# Patient Record
Sex: FEMALE | Race: WHITE | Employment: PART TIME
[De-identification: names, ages, dates, MRNs, and addresses within clinical notes are randomized per-mention and may not be internally consistent; named-entity substitution may affect disease eponyms.]

---

## 2017-02-10 ENCOUNTER — NURSE ONLY (OUTPATIENT)
Dept: FAMILY MEDICINE CLINIC | Facility: CLINIC | Age: 32
End: 2017-02-10

## 2017-02-10 ENCOUNTER — TELEPHONE (OUTPATIENT)
Dept: FAMILY MEDICINE CLINIC | Facility: CLINIC | Age: 32
End: 2017-02-10

## 2017-02-10 DIAGNOSIS — G43.919 INTRACTABLE MIGRAINE WITHOUT STATUS MIGRAINOSUS, UNSPECIFIED MIGRAINE TYPE: Primary | ICD-10-CM

## 2017-02-10 PROCEDURE — 96372 THER/PROPH/DIAG INJ SC/IM: CPT | Performed by: FAMILY MEDICINE

## 2017-02-10 RX ORDER — KETOROLAC TROMETHAMINE 30 MG/ML
30 INJECTION, SOLUTION INTRAMUSCULAR; INTRAVENOUS ONCE
Status: COMPLETED | OUTPATIENT
Start: 2017-02-10 | End: 2017-02-10

## 2017-02-10 RX ORDER — KETOROLAC TROMETHAMINE 30 MG/ML
30 INJECTION, SOLUTION INTRAMUSCULAR; INTRAVENOUS ONCE
Qty: 1 ML | Refills: 0
Start: 2017-02-10 | End: 2017-02-10 | Stop reason: CLARIF

## 2017-02-10 RX ORDER — PROMETHAZINE HYDROCHLORIDE 25 MG/ML
6.25 INJECTION, SOLUTION INTRAMUSCULAR; INTRAVENOUS EVERY 6 HOURS PRN
Status: DISCONTINUED | OUTPATIENT
Start: 2017-02-10 | End: 2021-03-03

## 2017-02-10 RX ORDER — PROMETHAZINE HYDROCHLORIDE 25 MG/ML
25 INJECTION, SOLUTION INTRAMUSCULAR; INTRAVENOUS ONCE
Qty: 1 ML | Refills: 0
Start: 2017-02-10 | End: 2017-02-10 | Stop reason: CLARIF

## 2017-02-10 RX ADMIN — PROMETHAZINE HYDROCHLORIDE 6.25 MG: 25 INJECTION, SOLUTION INTRAMUSCULAR; INTRAVENOUS at 11:26

## 2017-02-10 RX ADMIN — KETOROLAC TROMETHAMINE 30 MG: 30 INJECTION, SOLUTION INTRAMUSCULAR; INTRAVENOUS at 11:23

## 2017-02-17 ENCOUNTER — TELEPHONE (OUTPATIENT)
Dept: FAMILY MEDICINE CLINIC | Facility: CLINIC | Age: 32
End: 2017-02-17

## 2017-02-17 RX ORDER — SUMATRIPTAN 25 MG/1
25 TABLET, FILM COATED ORAL
Qty: 10 TABLET | Refills: 3 | Status: SHIPPED | OUTPATIENT
Start: 2017-02-17 | End: 2018-06-05

## 2017-02-24 ENCOUNTER — TELEPHONE (OUTPATIENT)
Dept: FAMILY MEDICINE CLINIC | Facility: CLINIC | Age: 32
End: 2017-02-24

## 2017-04-21 RX ORDER — PROMETHAZINE HYDROCHLORIDE 25 MG/1
25 TABLET ORAL EVERY 6 HOURS PRN
Qty: 30 TABLET | Refills: 0 | Status: SHIPPED | OUTPATIENT
Start: 2017-04-21 | End: 2017-04-28

## 2017-04-28 ENCOUNTER — OFFICE VISIT (OUTPATIENT)
Dept: FAMILY MEDICINE CLINIC | Age: 32
End: 2017-04-28
Payer: COMMERCIAL

## 2017-04-28 VITALS
HEIGHT: 64 IN | HEART RATE: 94 BPM | BODY MASS INDEX: 23.56 KG/M2 | OXYGEN SATURATION: 98 % | DIASTOLIC BLOOD PRESSURE: 62 MMHG | TEMPERATURE: 98.5 F | SYSTOLIC BLOOD PRESSURE: 102 MMHG | WEIGHT: 138 LBS

## 2017-04-28 DIAGNOSIS — J02.0 PHARYNGITIS DUE TO STREPTOCOCCUS SPECIES: Primary | ICD-10-CM

## 2017-04-28 LAB — S PYO AG THROAT QL: POSITIVE

## 2017-04-28 PROCEDURE — 87880 STREP A ASSAY W/OPTIC: CPT | Performed by: NURSE PRACTITIONER

## 2017-04-28 PROCEDURE — 99213 OFFICE O/P EST LOW 20 MIN: CPT | Performed by: NURSE PRACTITIONER

## 2017-04-28 RX ORDER — AMOXICILLIN 500 MG/1
500 CAPSULE ORAL 2 TIMES DAILY
Qty: 20 CAPSULE | Refills: 0 | Status: SHIPPED | OUTPATIENT
Start: 2017-04-28 | End: 2017-05-08

## 2017-04-28 ASSESSMENT — ENCOUNTER SYMPTOMS
SORE THROAT: 1
SWOLLEN GLANDS: 1
COUGH: 0
NAUSEA: 1
VOMITING: 1

## 2017-06-20 ENCOUNTER — TELEPHONE (OUTPATIENT)
Dept: FAMILY MEDICINE CLINIC | Age: 32
End: 2017-06-20

## 2017-06-20 RX ORDER — CEPHALEXIN 500 MG/1
500 CAPSULE ORAL 4 TIMES DAILY
Qty: 40 CAPSULE | Refills: 0 | Status: SHIPPED | OUTPATIENT
Start: 2017-06-20 | End: 2017-06-30

## 2017-09-02 ENCOUNTER — OFFICE VISIT (OUTPATIENT)
Dept: PRIMARY CARE CLINIC | Age: 32
End: 2017-09-02
Payer: COMMERCIAL

## 2017-09-02 VITALS
BODY MASS INDEX: 21.06 KG/M2 | HEART RATE: 56 BPM | WEIGHT: 122.7 LBS | DIASTOLIC BLOOD PRESSURE: 66 MMHG | TEMPERATURE: 97.9 F | SYSTOLIC BLOOD PRESSURE: 101 MMHG

## 2017-09-02 DIAGNOSIS — R30.0 DYSURIA: ICD-10-CM

## 2017-09-02 DIAGNOSIS — N30.00 ACUTE CYSTITIS WITHOUT HEMATURIA: Primary | ICD-10-CM

## 2017-09-02 PROCEDURE — 99213 OFFICE O/P EST LOW 20 MIN: CPT | Performed by: NURSE PRACTITIONER

## 2017-09-02 PROCEDURE — 81003 URINALYSIS AUTO W/O SCOPE: CPT | Performed by: NURSE PRACTITIONER

## 2017-09-02 RX ORDER — SUMATRIPTAN 25 MG/1
TABLET, FILM COATED ORAL
Refills: 3 | COMMUNITY
Start: 2017-07-24 | End: 2018-06-05

## 2017-09-02 RX ORDER — CEPHALEXIN 500 MG/1
500 CAPSULE ORAL 2 TIMES DAILY
Qty: 14 CAPSULE | Refills: 0 | Status: SHIPPED | OUTPATIENT
Start: 2017-09-02 | End: 2017-09-09

## 2017-09-02 RX ORDER — CEPHALEXIN 500 MG/1
CAPSULE ORAL
COMMUNITY
Start: 2017-06-20 | End: 2017-09-02 | Stop reason: ALTCHOICE

## 2017-09-02 ASSESSMENT — ENCOUNTER SYMPTOMS: ABDOMINAL PAIN: 1

## 2017-09-12 LAB
BILIRUBIN, POC: NEGATIVE
BLOOD URINE, POC: ABNORMAL
CLARITY, POC: ABNORMAL
COLOR, POC: ABNORMAL
GLUCOSE URINE, POC: NEGATIVE
KETONES, POC: NEGATIVE
LEUKOCYTE EST, POC: ABNORMAL
NITRITE, POC: NEGATIVE
PH, POC: 5.5
PROTEIN, POC: ABNORMAL
SPECIFIC GRAVITY, POC: 1.02
UROBILINOGEN, POC: 0.2

## 2017-09-20 ENCOUNTER — OFFICE VISIT (OUTPATIENT)
Dept: FAMILY MEDICINE CLINIC | Age: 32
End: 2017-09-20
Payer: COMMERCIAL

## 2017-09-20 VITALS
OXYGEN SATURATION: 98 % | BODY MASS INDEX: 22.83 KG/M2 | DIASTOLIC BLOOD PRESSURE: 62 MMHG | HEART RATE: 60 BPM | SYSTOLIC BLOOD PRESSURE: 102 MMHG | WEIGHT: 133 LBS

## 2017-09-20 DIAGNOSIS — N61.0 MASTITIS: ICD-10-CM

## 2017-09-20 DIAGNOSIS — H72.92 TYMPANIC MEMBRANE PERFORATION, LEFT: Primary | ICD-10-CM

## 2017-09-20 PROCEDURE — 99213 OFFICE O/P EST LOW 20 MIN: CPT | Performed by: FAMILY MEDICINE

## 2017-09-20 RX ORDER — SULFAMETHOXAZOLE AND TRIMETHOPRIM 800; 160 MG/1; MG/1
1 TABLET ORAL 2 TIMES DAILY
Qty: 14 TABLET | Refills: 0 | Status: SHIPPED | OUTPATIENT
Start: 2017-09-20 | End: 2017-09-27

## 2017-09-20 RX ORDER — OFLOXACIN 3 MG/ML
10 SOLUTION AURICULAR (OTIC) 2 TIMES DAILY
Qty: 10 ML | Refills: 0 | Status: SHIPPED | OUTPATIENT
Start: 2017-09-20 | End: 2017-10-04

## 2017-09-20 NOTE — PROGRESS NOTES
unexpected weight change. HENT: Positive for ear pain. Negative for ear discharge and trouble swallowing. Eyes: Negative for photophobia and visual disturbance. Respiratory: Negative for cough, shortness of breath and wheezing. Cardiovascular: Negative for chest pain and palpitations. Gastrointestinal: Negative for abdominal distention, abdominal pain, constipation, diarrhea, nausea and vomiting. Endocrine: Negative for polydipsia, polyphagia and polyuria. Musculoskeletal: Negative for arthralgias, back pain, gait problem, joint swelling, myalgias, neck pain and neck stiffness. Skin: Positive for rash. Negative for color change. Neurological: Negative for dizziness, syncope, weakness, light-headedness and headaches. Psychiatric/Behavioral: Negative for dysphoric mood. The patient is not nervous/anxious. Physical Exam:     Physical Exam   Constitutional: She is oriented to person, place, and time. She appears well-developed and well-nourished. HENT:   Head: Normocephalic and atraumatic. Right Ear: External ear normal.   Left Ear: External ear normal. Tympanic membrane is perforated. Eyes: Conjunctivae and EOM are normal.   Neck: Normal range of motion. Neck supple. No thyromegaly present. Cardiovascular: Normal rate, regular rhythm and normal heart sounds. Exam reveals no gallop and no friction rub. No murmur heard. Pulmonary/Chest: Effort normal and breath sounds normal. No respiratory distress. She has no wheezes. She has no rales. She exhibits no tenderness. Abdominal: Soft. Bowel sounds are normal. She exhibits no distension. There is no tenderness. There is no rebound and no guarding. Musculoskeletal: Normal range of motion. She exhibits no edema. Lymphadenopathy:     She has no cervical adenopathy. Neurological: She is alert and oriented to person, place, and time. She exhibits normal muscle tone. Coordination normal.   Skin: Skin is warm and dry.  No rash noted. No erythema. Psychiatric: She has a normal mood and affect. Her behavior is normal. Judgment and thought content normal.   Nursing note and vitals reviewed. Vitals:  /62  Pulse 60  Wt 133 lb (60.3 kg)  LMP 08/26/2017  SpO2 98%  BMI 22.83 kg/m2      Data:     Lab Results   Component Value Date     12/31/2011    K 4.0 12/31/2011     12/31/2011    CO2 27 12/31/2011    BUN 14 12/31/2011    CREATININE 0.65 12/31/2011    GLUCOSE 74 12/31/2011    PROT 7.7 04/10/2015    LABALBU 4.9 04/10/2015    BILITOT 0.53 04/10/2015    ALKPHOS 45 04/10/2015    AST 22 04/10/2015    ALT 22 04/10/2015     Lab Results   Component Value Date    WBC 6.9 04/10/2015    RBC 4.28 04/10/2015    RBC 4.26 12/31/2011    HGB 13.3 04/10/2015    HCT 39.1 04/10/2015    MCV 91.5 04/10/2015    MCH 31.0 04/10/2015    MCHC 33.9 04/10/2015    RDW 13.2 04/10/2015     04/10/2015     12/31/2011    MPV NOT REPORTED 04/10/2015     No results found for: TSH  No results found for: CHOL, HDL, PSA, LABA1C       Assessment:       1. Tympanic membrane perforation, left     2. Mastitis         Plan:   1. Tympanic membrane perforation on the left-likely secondary to the pressure changes from when patient was scattered. Will Rx ofloxacin drops. 2.  Mastitis-patient symptoms consistent with mastitis, will Rx Bactrim. Call if not improving. Completed Refills   Requested Prescriptions     Signed Prescriptions Disp Refills    ofloxacin (FLOXIN) 0.3 % otic solution 10 mL 0     Sig: Place 10 drops into both ears 2 times daily for 14 days    sulfamethoxazole-trimethoprim (BACTRIM DS) 800-160 MG per tablet 14 tablet 0     Sig: Take 1 tablet by mouth 2 times daily for 7 days     Return if symptoms worsen or fail to improve.   Orders Placed This Encounter   Medications    ofloxacin (FLOXIN) 0.3 % otic solution     Sig: Place 10 drops into both ears 2 times daily for 14 days     Dispense:  10 mL     Refill:  0

## 2017-10-03 ASSESSMENT — ENCOUNTER SYMPTOMS
BACK PAIN: 0
VOMITING: 0
SHORTNESS OF BREATH: 0
TROUBLE SWALLOWING: 0
ABDOMINAL DISTENTION: 0
WHEEZING: 0
DIARRHEA: 0
CONSTIPATION: 0
PHOTOPHOBIA: 0
COLOR CHANGE: 0
NAUSEA: 0
COUGH: 0
ABDOMINAL PAIN: 0

## 2017-10-13 ENCOUNTER — OFFICE VISIT (OUTPATIENT)
Dept: FAMILY MEDICINE CLINIC | Age: 32
End: 2017-10-13
Payer: COMMERCIAL

## 2017-10-13 VITALS
HEART RATE: 68 BPM | WEIGHT: 126 LBS | SYSTOLIC BLOOD PRESSURE: 102 MMHG | OXYGEN SATURATION: 98 % | DIASTOLIC BLOOD PRESSURE: 74 MMHG | BODY MASS INDEX: 21.63 KG/M2

## 2017-10-13 DIAGNOSIS — Z13.220 SCREENING FOR LIPOID DISORDERS: ICD-10-CM

## 2017-10-13 DIAGNOSIS — Z13.1 ENCOUNTER FOR SCREENING EXAMINATION FOR IMPAIRED GLUCOSE REGULATION AND DIABETES MELLITUS: ICD-10-CM

## 2017-10-13 DIAGNOSIS — Z00.00 WELL ADULT EXAM: Primary | ICD-10-CM

## 2017-10-13 PROCEDURE — 99395 PREV VISIT EST AGE 18-39: CPT | Performed by: FAMILY MEDICINE

## 2017-10-19 ENCOUNTER — HOSPITAL ENCOUNTER (OUTPATIENT)
Age: 32
Discharge: HOME | End: 2017-10-19
Payer: COMMERCIAL

## 2017-10-19 DIAGNOSIS — Z13.220 SCREENING FOR LIPOID DISORDERS: ICD-10-CM

## 2017-10-19 DIAGNOSIS — Z13.1 ENCOUNTER FOR SCREENING EXAMINATION FOR IMPAIRED GLUCOSE REGULATION AND DIABETES MELLITUS: ICD-10-CM

## 2017-10-19 DIAGNOSIS — Z00.00 WELL ADULT EXAM: ICD-10-CM

## 2017-10-19 LAB
CHOLESTEROL/HDL RATIO: 2.2
CHOLESTEROL: 136 MG/DL
GLUCOSE FASTING: 83 MG/DL (ref 70–99)
HDLC SERPL-MCNC: 62 MG/DL
LDL CHOLESTEROL: 66 MG/DL (ref 0–130)
PATIENT FASTING?: YES
TRIGL SERPL-MCNC: 40 MG/DL
VLDLC SERPL CALC-MCNC: NORMAL MG/DL (ref 1–30)

## 2017-10-19 PROCEDURE — 36415 COLL VENOUS BLD VENIPUNCTURE: CPT

## 2017-10-19 PROCEDURE — 80061 LIPID PANEL: CPT

## 2017-10-19 PROCEDURE — 82947 ASSAY GLUCOSE BLOOD QUANT: CPT

## 2017-10-29 ASSESSMENT — ENCOUNTER SYMPTOMS
ABDOMINAL PAIN: 0
SHORTNESS OF BREATH: 0
VOMITING: 0
WHEEZING: 0
PHOTOPHOBIA: 0
BACK PAIN: 0
COUGH: 0
TROUBLE SWALLOWING: 0
DIARRHEA: 0
ABDOMINAL DISTENTION: 0
CONSTIPATION: 0
COLOR CHANGE: 0

## 2017-10-29 NOTE — PROGRESS NOTES
HPI Notes    Name: Sahara Montgomery  : 1985         Chief Complaint:     Chief Complaint   Patient presents with    Annual Exam       History of Present Illness:      Sahara Montgomery is a 28 y.o.  female who presents with Annual Exam      HPI  Patient here for wellness visit for work. Patient states that she is doing well except she has had a recent viral illness which has been bothering her. Patient states that she is resting and taking Tylenol and staying hydrated. Patient denies any chest pain, shortness of breath, blurred vision. Patient states that she tries to shows regularly and eat healthy. Patient denies any other acute problems or complaints at this time. Past Medical History:     No past medical history on file. Reviewed all health maintenance requirements and ordered appropriate tests  Health Maintenance Due   Topic Date Due    HIV screen  2000    DTaP/Tdap/Td vaccine (1 - Tdap) 2004    Cervical cancer screen  2006    Flu vaccine (1) 2017       Past Surgical History:     Past Surgical History:   Procedure Laterality Date    APPENDECTOMY      OVARY REMOVAL Left     TYMPANOSTOMY TUBE PLACEMENT          Medications:       Prior to Admission medications    Medication Sig Start Date End Date Taking? Authorizing Provider   SUMAtriptan (IMITREX) 25 MG tablet TAKE 1 TABLET BY MOUTH once AS NEEDED FOR MIGRAINE - - may repeat once in 2 (TWO) hours, not more than 2 (TWO) pills in 24 hours 17  Yes Historical Provider, MD   ACZONE 5 % GEL Apply daily 11/13/15  Yes Historical Provider, MD   SUMAtriptan (IMITREX) 25 MG tablet Take 1 tablet by mouth once as needed for Migraine (may repeat once in 2 hours, not to take more than 2 pills in 24 hours) 17  Mira Speak, DO        Allergies:       Review of patient's allergies indicates no known allergies. Social History:     Tobacco:    reports that she has never smoked.  She has never used smokeless tobacco.  Alcohol:      reports that she does not drink alcohol. Drug Use:  reports that she does not use drugs. Family History:     Family History   Problem Relation Age of Onset    Hypertension Mother        Review of Systems:     Positive and Negative as described in HPI    Review of Systems   Constitutional: Negative for activity change, appetite change, fever and unexpected weight change. HENT: Positive for congestion. Negative for ear discharge, ear pain and trouble swallowing. Eyes: Negative for photophobia and visual disturbance. Respiratory: Negative for cough, shortness of breath and wheezing. Cardiovascular: Negative for chest pain and palpitations. Gastrointestinal: Negative for abdominal distention, abdominal pain, constipation, diarrhea and vomiting. Endocrine: Negative for polydipsia, polyphagia and polyuria. Genitourinary: Negative for frequency and urgency. Musculoskeletal: Negative for arthralgias, back pain, gait problem, joint swelling, myalgias, neck pain and neck stiffness. Skin: Negative for color change and rash. Allergic/Immunologic: Negative for environmental allergies and food allergies. Neurological: Negative for dizziness, syncope, weakness, light-headedness and headaches. Hematological: Negative for adenopathy. Does not bruise/bleed easily. Psychiatric/Behavioral: Negative for dysphoric mood. The patient is not nervous/anxious. Physical Exam:     Physical Exam   Constitutional: She is oriented to person, place, and time. She appears well-developed and well-nourished. HENT:   Head: Normocephalic and atraumatic. Right Ear: External ear normal.   Left Ear: External ear normal.   Eyes: Conjunctivae and EOM are normal.   Neck: Normal range of motion. Neck supple. No thyromegaly present. Cardiovascular: Normal rate, regular rhythm and normal heart sounds. Exam reveals no gallop and no friction rub.     No murmur heard.  Pulmonary/Chest: Effort normal and breath sounds normal. No respiratory distress. She has no wheezes. She has no rales. She exhibits no tenderness. Abdominal: Soft. Bowel sounds are normal. She exhibits no distension. There is no tenderness. There is no rebound and no guarding. Musculoskeletal: Normal range of motion. She exhibits no edema. Lymphadenopathy:     She has no cervical adenopathy. Neurological: She is alert and oriented to person, place, and time. She exhibits normal muscle tone. Coordination normal.   Skin: Skin is warm and dry. No rash noted. No erythema. Psychiatric: She has a normal mood and affect. Her behavior is normal. Judgment and thought content normal.   Nursing note and vitals reviewed. Vitals:  /74   Pulse 68   Wt 126 lb (57.2 kg)   SpO2 98%   BMI 21.63 kg/m²       Data:     Lab Results   Component Value Date     12/31/2011    K 4.0 12/31/2011     12/31/2011    CO2 27 12/31/2011    BUN 14 12/31/2011    CREATININE 0.65 12/31/2011    GLUCOSE 74 12/31/2011    PROT 7.7 04/10/2015    LABALBU 4.9 04/10/2015    BILITOT 0.53 04/10/2015    ALKPHOS 45 04/10/2015    AST 22 04/10/2015    ALT 22 04/10/2015     Lab Results   Component Value Date    WBC 6.9 04/10/2015    RBC 4.28 04/10/2015    RBC 4.26 12/31/2011    HGB 13.3 04/10/2015    HCT 39.1 04/10/2015    MCV 91.5 04/10/2015    MCH 31.0 04/10/2015    MCHC 33.9 04/10/2015    RDW 13.2 04/10/2015     04/10/2015     12/31/2011    MPV NOT REPORTED 04/10/2015     No results found for: TSH  Lab Results   Component Value Date    CHOL 136 10/19/2017    HDL 62 10/19/2017          Assessment:       1. Well adult exam  Lipid Panel    Glucose, Fasting   2. Screening for lipoid disorders  Lipid Panel   3. Encounter for screening examination for impaired glucose regulation and diabetes mellitus  Glucose, Fasting       Plan:   1.   Will.exam-continue current care, discussed nutrition and exercise with plan consists of No treatment change needed. Lab Results   Component Value Date    LDLCHOLESTEROL 66 10/19/2017    (goal LDL reduction with dx if diabetes is 50% LDL reduction)      No flowsheet data found.   Interpretation of Total Score Depression Severity: 1-4 = Minimal depression, 5-9 = Mild depression, 10-14 = Moderate depression, 15-19 = Moderately severe depression, 20-27 = Severe depression

## 2017-12-28 ENCOUNTER — OFFICE VISIT (OUTPATIENT)
Dept: FAMILY MEDICINE CLINIC | Age: 32
End: 2017-12-28

## 2017-12-28 VITALS
DIASTOLIC BLOOD PRESSURE: 60 MMHG | WEIGHT: 136 LBS | SYSTOLIC BLOOD PRESSURE: 102 MMHG | HEART RATE: 80 BPM | BODY MASS INDEX: 23.34 KG/M2 | OXYGEN SATURATION: 99 %

## 2017-12-28 DIAGNOSIS — M99.01 CERVICAL SOMATIC DYSFUNCTION: Primary | ICD-10-CM

## 2017-12-28 PROCEDURE — 99212 OFFICE O/P EST SF 10 MIN: CPT | Performed by: FAMILY MEDICINE

## 2017-12-28 RX ORDER — CYCLOBENZAPRINE HCL 5 MG
5 TABLET ORAL 3 TIMES DAILY PRN
Qty: 30 TABLET | Refills: 0 | Status: SHIPPED | OUTPATIENT
Start: 2017-12-28 | End: 2018-01-07

## 2017-12-28 NOTE — PATIENT INSTRUCTIONS
SURVEY:    You may be receiving a survey from [employer] regarding your visit today. Please complete the survey to enable us to provide the highest quality of care to you and your family. If you cannot score us a very good on any question, please call the office to discuss how we could of made your experience a very good one. Thank you.

## 2018-01-01 ENCOUNTER — HOSPITAL ENCOUNTER (OUTPATIENT)
Dept: GENERAL RADIOLOGY | Age: 33
Discharge: HOME | End: 2018-01-01
Payer: COMMERCIAL

## 2018-01-01 ENCOUNTER — HOSPITAL ENCOUNTER (OUTPATIENT)
Age: 33
Discharge: HOME | End: 2018-01-01
Payer: COMMERCIAL

## 2018-01-01 DIAGNOSIS — M99.01 CERVICAL SOMATIC DYSFUNCTION: ICD-10-CM

## 2018-01-01 PROCEDURE — 72050 X-RAY EXAM NECK SPINE 4/5VWS: CPT

## 2018-01-02 ENCOUNTER — TELEPHONE (OUTPATIENT)
Dept: FAMILY MEDICINE CLINIC | Age: 33
End: 2018-01-02

## 2018-01-02 DIAGNOSIS — M54.2 NECK PAIN: Primary | ICD-10-CM

## 2018-01-29 ENCOUNTER — OFFICE VISIT (OUTPATIENT)
Dept: PRIMARY CARE CLINIC | Age: 33
End: 2018-01-29
Payer: COMMERCIAL

## 2018-01-29 VITALS
RESPIRATION RATE: 20 BRPM | BODY MASS INDEX: 24.29 KG/M2 | WEIGHT: 132 LBS | OXYGEN SATURATION: 97 % | HEIGHT: 62 IN | SYSTOLIC BLOOD PRESSURE: 117 MMHG | DIASTOLIC BLOOD PRESSURE: 79 MMHG | TEMPERATURE: 98.2 F | HEART RATE: 74 BPM

## 2018-01-29 DIAGNOSIS — J06.9 UPPER RESPIRATORY TRACT INFECTION, UNSPECIFIED TYPE: ICD-10-CM

## 2018-01-29 DIAGNOSIS — R05.9 COUGH: ICD-10-CM

## 2018-01-29 DIAGNOSIS — J04.0 LARYNGITIS: Primary | ICD-10-CM

## 2018-01-29 DIAGNOSIS — J02.9 SORE THROAT: ICD-10-CM

## 2018-01-29 LAB
INFLUENZA A ANTIBODY: NEGATIVE
INFLUENZA B ANTIBODY: NEGATIVE
S PYO AG THROAT QL: NORMAL

## 2018-01-29 PROCEDURE — 87804 INFLUENZA ASSAY W/OPTIC: CPT | Performed by: NURSE PRACTITIONER

## 2018-01-29 PROCEDURE — 87880 STREP A ASSAY W/OPTIC: CPT | Performed by: NURSE PRACTITIONER

## 2018-01-29 PROCEDURE — 99213 OFFICE O/P EST LOW 20 MIN: CPT | Performed by: NURSE PRACTITIONER

## 2018-01-29 ASSESSMENT — ENCOUNTER SYMPTOMS
STRIDOR: 0
WHEEZING: 0
SORE THROAT: 1
SHORTNESS OF BREATH: 0
COUGH: 1

## 2018-01-30 NOTE — PATIENT INSTRUCTIONS
syndrome, a serious illness. · Drink plenty of fluids, enough so that your urine is light yellow or clear like water. Hot fluids, such as tea or soup, may help relieve congestion in your nose and throat. If you have kidney, heart, or liver disease and have to limit fluids, talk with your doctor before you increase the amount of fluids you drink. · Try to clear mucus from your lungs by breathing deeply and coughing. · Gargle with warm salt water once an hour. This can help reduce swelling and throat pain. Use 1 teaspoon of salt mixed in 1 cup of warm water. · Do not smoke or allow others to smoke around you. If you need help quitting, talk to your doctor about stop-smoking programs and medicines. These can increase your chances of quitting for good. To avoid spreading the virus  · Cough or sneeze into a tissue. Then throw the tissue away. · If you don't have a tissue, use your hand to cover your cough or sneeze. Then clean your hand. You can also cough into your sleeve. · Wash your hands often. Use soap and warm water. Wash for 15 to 20 seconds each time. · If you don't have soap and water near you, you can clean your hands with alcohol wipes or gel. When should you call for help? Call your doctor now or seek immediate medical care if:  ? · You have a new or higher fever. ? · Your fever lasts more than 48 hours. ? · You have trouble breathing. ? · You have a fever with a stiff neck or a severe headache. ? · You are sensitive to light. ? · You feel very sleepy or confused. ? Watch closely for changes in your health, and be sure to contact your doctor if:  ? · You do not get better as expected. Where can you learn more? Go to [email or web address]://[email or web address]. org and sign in to your [employer] account. Enter O754 in the [employer] box to learn more about \"Viral Respiratory Infection: Care Instructions. \"     If you do not have an account, please click on the \"Sign Up Now\"

## 2018-02-01 ENCOUNTER — TELEPHONE (OUTPATIENT)
Dept: PRIMARY CARE CLINIC | Age: 33
End: 2018-02-01

## 2018-02-01 DIAGNOSIS — J04.0 ACUTE LARYNGITIS: Primary | ICD-10-CM

## 2018-02-01 RX ORDER — PREDNISONE 20 MG/1
TABLET ORAL
Qty: 12 TABLET | Refills: 0 | Status: SHIPPED | OUTPATIENT
Start: 2018-02-01 | End: 2018-02-28 | Stop reason: ALTCHOICE

## 2018-02-23 ENCOUNTER — HOSPITAL ENCOUNTER (OUTPATIENT)
Dept: PHYSICAL THERAPY | Age: 33
Setting detail: THERAPIES SERIES
Discharge: HOME | End: 2018-02-23
Payer: COMMERCIAL

## 2018-02-23 PROCEDURE — 97110 THERAPEUTIC EXERCISES: CPT

## 2018-02-23 PROCEDURE — 97161 PT EVAL LOW COMPLEX 20 MIN: CPT

## 2018-02-23 ASSESSMENT — PAIN SCALES - GENERAL: PAINLEVEL_OUTOF10: 3

## 2018-02-23 ASSESSMENT — PAIN DESCRIPTION - LOCATION: LOCATION: NECK

## 2018-02-23 ASSESSMENT — PAIN DESCRIPTION - DIRECTION: RADIATING_TOWARDS: LUE

## 2018-02-23 NOTE — PROGRESS NOTES
Patient goals : To manage neck pain long term and function without pain.      Timed Code Treatment Minutes: 20 Minutes  Total Treatment Time: 60     Time In: 036  Time Out: Britt 996, [facility]/Directly Supervised By: Araceli Dobbins, PT  2/23/2018

## 2018-02-28 ENCOUNTER — HOSPITAL ENCOUNTER (OUTPATIENT)
Age: 33
Setting detail: SPECIMEN
Discharge: HOME | End: 2018-02-28
Payer: COMMERCIAL

## 2018-02-28 ENCOUNTER — OFFICE VISIT (OUTPATIENT)
Dept: FAMILY MEDICINE CLINIC | Age: 33
End: 2018-02-28
Payer: COMMERCIAL

## 2018-02-28 VITALS
HEIGHT: 64 IN | SYSTOLIC BLOOD PRESSURE: 108 MMHG | RESPIRATION RATE: 18 BRPM | HEART RATE: 66 BPM | BODY MASS INDEX: 23.22 KG/M2 | WEIGHT: 136 LBS | OXYGEN SATURATION: 99 % | TEMPERATURE: 98.6 F | DIASTOLIC BLOOD PRESSURE: 64 MMHG

## 2018-02-28 DIAGNOSIS — N89.8 VAGINAL ITCHING: Primary | ICD-10-CM

## 2018-02-28 LAB
DIRECT EXAM: ABNORMAL
Lab: ABNORMAL
SPECIMEN DESCRIPTION: ABNORMAL
STATUS: ABNORMAL

## 2018-02-28 PROCEDURE — 87210 SMEAR WET MOUNT SALINE/INK: CPT

## 2018-02-28 PROCEDURE — 99213 OFFICE O/P EST LOW 20 MIN: CPT | Performed by: NURSE PRACTITIONER

## 2018-02-28 RX ORDER — PROMETHAZINE HYDROCHLORIDE 12.5 MG/1
12.5 TABLET ORAL EVERY 6 HOURS PRN
COMMUNITY
End: 2018-06-05 | Stop reason: SDUPTHER

## 2018-02-28 ASSESSMENT — PATIENT HEALTH QUESTIONNAIRE - PHQ9
SUM OF ALL RESPONSES TO PHQ9 QUESTIONS 1 & 2: 0
SUM OF ALL RESPONSES TO PHQ QUESTIONS 1-9: 0
1. LITTLE INTEREST OR PLEASURE IN DOING THINGS: 0
2. FEELING DOWN, DEPRESSED OR HOPELESS: 0

## 2018-03-02 ENCOUNTER — HOSPITAL ENCOUNTER (OUTPATIENT)
Dept: PHYSICAL THERAPY | Age: 33
Setting detail: THERAPIES SERIES
Discharge: HOME | End: 2018-03-02
Payer: COMMERCIAL

## 2018-03-02 PROCEDURE — 97110 THERAPEUTIC EXERCISES: CPT

## 2018-03-02 PROCEDURE — 97140 MANUAL THERAPY 1/> REGIONS: CPT

## 2018-03-02 RX ORDER — INFLUENZA A VIRUS A/SINGAPORE/GP1908/2015 IVR-180A (H1N1) ANTIGEN (PROPIOLACTONE INACTIVATED), INFLUENZA A VIRUS A/SINGAPORE/INFIMH-16-0019/2016 IVR-186 (H3N2) ANTIGEN (PROPIOLACTONE INACTIVATED) AND INFLUENZA B VIRUS B/MARYLAND/15/2016 ANTIGEN (PROPIOLACTONE INACTIVATED) 15; 15; 15 UG/.5ML; UG/.5ML; UG/.5ML
INJECTION, SUSPENSION INTRAMUSCULAR
COMMUNITY
Start: 2018-01-07 | End: 2018-06-05

## 2018-03-02 ASSESSMENT — ENCOUNTER SYMPTOMS
ABDOMINAL DISTENTION: 0
CHEST TIGHTNESS: 0

## 2018-03-02 ASSESSMENT — PAIN SCALES - GENERAL: PAINLEVEL_OUTOF10: 0

## 2018-03-02 NOTE — PROGRESS NOTES
improved sleep quality.         Post Treatment Pain:  0/10    Time In: 815    Time Out : 900        Timed Code Treatment Minutes: 45 Minutes  Total Treatment Time: 39 Minutes    Chase Herrera, GENOVEVA /Directly Supervised by Amy Fernandez, PT     Date: 3/2/2018

## 2018-03-09 ENCOUNTER — HOSPITAL ENCOUNTER (OUTPATIENT)
Dept: PHYSICAL THERAPY | Age: 33
Setting detail: THERAPIES SERIES
End: 2018-03-09
Payer: COMMERCIAL

## 2018-03-13 ENCOUNTER — TELEPHONE (OUTPATIENT)
Dept: FAMILY MEDICINE CLINIC | Age: 33
End: 2018-03-13

## 2018-03-13 RX ORDER — OSELTAMIVIR PHOSPHATE 75 MG/1
75 CAPSULE ORAL 2 TIMES DAILY
Qty: 10 CAPSULE | Refills: 0 | Status: SHIPPED | OUTPATIENT
Start: 2018-03-13 | End: 2018-03-18

## 2018-03-16 ENCOUNTER — HOSPITAL ENCOUNTER (OUTPATIENT)
Dept: PHYSICAL THERAPY | Age: 33
Setting detail: THERAPIES SERIES
Discharge: HOME | End: 2018-03-16
Payer: COMMERCIAL

## 2018-03-16 PROCEDURE — 97140 MANUAL THERAPY 1/> REGIONS: CPT

## 2018-03-16 PROCEDURE — 97110 THERAPEUTIC EXERCISES: CPT

## 2018-03-16 ASSESSMENT — PAIN SCALES - GENERAL: PAINLEVEL_OUTOF10: 2

## 2018-03-16 ASSESSMENT — PAIN DESCRIPTION - LOCATION: LOCATION: NECK

## 2018-04-16 ENCOUNTER — TELEPHONE (OUTPATIENT)
Dept: FAMILY MEDICINE CLINIC | Age: 33
End: 2018-04-16

## 2018-04-18 RX ORDER — TOPIRAMATE 25 MG/1
25 TABLET ORAL NIGHTLY
Qty: 30 TABLET | Refills: 3 | Status: SHIPPED | OUTPATIENT
Start: 2018-04-18 | End: 2018-06-05 | Stop reason: SDUPTHER

## 2018-06-05 ENCOUNTER — TELEPHONE (OUTPATIENT)
Dept: FAMILY MEDICINE CLINIC | Age: 33
End: 2018-06-05

## 2018-06-05 RX ORDER — MULTIVITAMIN,THERAPEUTIC
TABLET ORAL
COMMUNITY
End: 2019-06-25 | Stop reason: ALTCHOICE

## 2018-06-05 RX ORDER — CYCLOBENZAPRINE HCL 5 MG
TABLET ORAL
COMMUNITY
Start: 2017-12-28 | End: 2019-05-22 | Stop reason: ALTCHOICE

## 2018-06-05 RX ORDER — SUMATRIPTAN 50 MG/1
50 TABLET, FILM COATED ORAL
Qty: 9 TABLET | Refills: 2 | Status: SHIPPED | OUTPATIENT
Start: 2018-06-05 | End: 2019-05-22 | Stop reason: ALTCHOICE

## 2018-06-05 RX ORDER — PROMETHAZINE HYDROCHLORIDE 12.5 MG/1
12.5 TABLET ORAL EVERY 6 HOURS PRN
Qty: 30 TABLET | Refills: 2 | Status: SHIPPED | OUTPATIENT
Start: 2018-06-05

## 2018-06-05 RX ORDER — SUMATRIPTAN 25 MG/1
TABLET, FILM COATED ORAL
COMMUNITY
Start: 2018-02-15 | End: 2018-06-05 | Stop reason: SDUPTHER

## 2018-06-05 RX ORDER — TOPIRAMATE 50 MG/1
50 TABLET, FILM COATED ORAL NIGHTLY
Qty: 90 TABLET | Refills: 1 | Status: SHIPPED | OUTPATIENT
Start: 2018-06-05 | End: 2018-08-16 | Stop reason: SDUPTHER

## 2018-07-26 ENCOUNTER — OFFICE VISIT (OUTPATIENT)
Dept: FAMILY MEDICINE CLINIC | Age: 33
End: 2018-07-26
Payer: COMMERCIAL

## 2018-07-26 VITALS — TEMPERATURE: 98.8 F | WEIGHT: 124 LBS | BODY MASS INDEX: 21.17 KG/M2 | HEIGHT: 64 IN

## 2018-07-26 DIAGNOSIS — J02.9 ACUTE PHARYNGITIS, UNSPECIFIED ETIOLOGY: Primary | ICD-10-CM

## 2018-07-26 LAB — S PYO AG THROAT QL: NORMAL

## 2018-07-26 PROCEDURE — 99213 OFFICE O/P EST LOW 20 MIN: CPT | Performed by: FAMILY MEDICINE

## 2018-07-26 PROCEDURE — 87880 STREP A ASSAY W/OPTIC: CPT | Performed by: FAMILY MEDICINE

## 2018-07-26 RX ORDER — AZITHROMYCIN 250 MG/1
TABLET, FILM COATED ORAL
Qty: 1 PACKET | Refills: 0 | Status: SHIPPED | OUTPATIENT
Start: 2018-07-26 | End: 2018-07-30

## 2018-07-26 NOTE — PATIENT INSTRUCTIONS
SURVEY:    You may be receiving a survey from [employer] regarding your visit today. Please complete the survey to enable us to provide the highest quality of care to you and your family. If you cannot score us as very good on any question, please call the office to discuss how we could have made your experience exceptional.     Thank you.

## 2018-07-27 ASSESSMENT — ENCOUNTER SYMPTOMS: GASTROINTESTINAL NEGATIVE: 1

## 2018-08-16 RX ORDER — TOPIRAMATE 50 MG/1
50 TABLET, FILM COATED ORAL NIGHTLY
Qty: 30 TABLET | Refills: 5 | Status: SHIPPED | OUTPATIENT
Start: 2018-08-16 | End: 2019-04-08 | Stop reason: SDUPTHER

## 2019-02-13 ENCOUNTER — OFFICE VISIT (OUTPATIENT)
Dept: PRIMARY CARE CLINIC | Age: 34
End: 2019-02-13
Payer: COMMERCIAL

## 2019-02-13 VITALS
HEART RATE: 64 BPM | OXYGEN SATURATION: 100 % | SYSTOLIC BLOOD PRESSURE: 112 MMHG | BODY MASS INDEX: 23.34 KG/M2 | DIASTOLIC BLOOD PRESSURE: 74 MMHG | WEIGHT: 136 LBS | TEMPERATURE: 98 F

## 2019-02-13 DIAGNOSIS — J02.9 PHARYNGITIS, UNSPECIFIED ETIOLOGY: Primary | ICD-10-CM

## 2019-02-13 DIAGNOSIS — J02.9 SORE THROAT: ICD-10-CM

## 2019-02-13 LAB — S PYO AG THROAT QL: NORMAL

## 2019-02-13 PROCEDURE — 87880 STREP A ASSAY W/OPTIC: CPT | Performed by: NURSE PRACTITIONER

## 2019-02-13 PROCEDURE — 99213 OFFICE O/P EST LOW 20 MIN: CPT | Performed by: NURSE PRACTITIONER

## 2019-02-13 RX ORDER — SUMATRIPTAN 50 MG/1
TABLET, FILM COATED ORAL
Refills: 2 | COMMUNITY
Start: 2019-01-03 | End: 2019-07-17 | Stop reason: SDUPTHER

## 2019-02-14 ASSESSMENT — ENCOUNTER SYMPTOMS
COUGH: 0
VOMITING: 0
NAUSEA: 0
SORE THROAT: 1

## 2019-04-08 RX ORDER — TOPIRAMATE 50 MG/1
50 TABLET, FILM COATED ORAL NIGHTLY
Qty: 30 TABLET | Refills: 5 | Status: SHIPPED
Start: 2019-04-08 | End: 2020-02-21 | Stop reason: SINTOL

## 2019-04-08 NOTE — TELEPHONE ENCOUNTER
Last visit:  7/26/2018  Next Visit Date:  No future appointments. Last Med refill:    Medication List:  Prior to Admission medications    Medication Sig Start Date End Date Taking? Authorizing Provider   SUMAtriptan (IMITREX) 50 MG tablet TAKE 1 TABLET BY MOUTH once AS NEEDED FOR MIGRAINE, may repeat in ONE HOUR if needed. Max dose 200 mg in 24 HOURS period 1/3/19   Historical Provider, MD   topiramate (TOPAMAX) 50 MG tablet TAKE 1 TABLET BY MOUTH nightly 8/16/18   Sheila Oats, DO   cyclobenzaprine (FLEXERIL) 5 MG tablet TAKE 1 TABLET BY MOUTH THREE TIMES DAILY AS NEEDED for muscle spasms 12/28/17   Historical Provider, MD   Multiple Vitamin (THERA/BETA-CAROTENE) TABS Take by mouth    Historical Provider, MD   promethazine (PHENERGAN) 12.5 MG tablet Take 1 tablet by mouth every 6 hours as needed for Nausea 6/5/18   Sheila Oats, DO   SUMAtriptan (IMITREX) 50 MG tablet Take 1 tablet by mouth once as needed for Migraine May repeat in 1h if needed. Max dose 200 mg in 24h period. 6/5/18 7/26/18  Sheila Oats, DO   ACZONE 5 % GEL Apply daily 11/13/15   Historical Provider, MD       Allergies:  Patient has no known allergies.     No results found for: LABA1C          ( goal A1C is < 7)   No results found for: LABMICR  LDL Cholesterol (mg/dL)   Date Value   10/19/2017 66       (goal LDL is <100)   AST (U/L)   Date Value   04/10/2015 22     ALT (U/L)   Date Value   04/10/2015 22     BUN (mg/dL)   Date Value   12/31/2011 14     BP Readings from Last 3 Encounters:   02/13/19 112/74   02/28/18 108/64   01/29/18 117/79          (goal 120/80)

## 2019-04-08 NOTE — TELEPHONE ENCOUNTER
topamax 50 mg    DM-henry    Health Maintenance   Topic Date Due    Varicella Vaccine (1 of 2 - 13+ 2-dose series) 04/11/1998    HIV screen  04/11/2000    DTaP/Tdap/Td vaccine (1 - Tdap) 04/11/2004    Cervical cancer screen  04/11/2006    Flu vaccine  Completed             (applicable per patient's age: Cancer Screenings, Depression Screening, Fall Risk Screening, Immunizations)    LDL Cholesterol (mg/dL)   Date Value   10/19/2017 66     AST (U/L)   Date Value   04/10/2015 22     ALT (U/L)   Date Value   04/10/2015 22     BUN (mg/dL)   Date Value   12/31/2011 14      (goal A1C is < 7)   (goal LDL is <100) need 30-50% reduction from baseline     BP Readings from Last 3 Encounters:   02/13/19 112/74   02/28/18 108/64   01/29/18 117/79    (goal /80)      All Future Testing planned in [facility]:      Next Visit Date:  No future appointments. There is no problem list on file for this patient.

## 2019-05-20 ENCOUNTER — APPOINTMENT (OUTPATIENT)
Dept: GENERAL RADIOLOGY | Age: 34
End: 2019-05-20
Payer: COMMERCIAL

## 2019-05-20 ENCOUNTER — HOSPITAL ENCOUNTER (EMERGENCY)
Age: 34
Discharge: HOME | End: 2019-05-20
Attending: EMERGENCY MEDICINE
Payer: COMMERCIAL

## 2019-05-20 VITALS
HEART RATE: 62 BPM | BODY MASS INDEX: 23.35 KG/M2 | DIASTOLIC BLOOD PRESSURE: 67 MMHG | TEMPERATURE: 98.2 F | HEIGHT: 64 IN | OXYGEN SATURATION: 100 % | RESPIRATION RATE: 12 BRPM | SYSTOLIC BLOOD PRESSURE: 107 MMHG | WEIGHT: 136.8 LBS

## 2019-05-20 DIAGNOSIS — R00.2 PALPITATIONS: Primary | ICD-10-CM

## 2019-05-20 DIAGNOSIS — E87.6 HYPOKALEMIA: ICD-10-CM

## 2019-05-20 LAB
-: NORMAL
ABSOLUTE EOS #: 0 K/UL (ref 0–0.4)
ABSOLUTE IMMATURE GRANULOCYTE: ABNORMAL K/UL (ref 0–0.3)
ABSOLUTE LYMPH #: 1.9 K/UL (ref 1–4.8)
ABSOLUTE MONO #: 0.3 K/UL (ref 0–1)
AMORPHOUS: NORMAL
ANION GAP SERPL CALCULATED.3IONS-SCNC: 13 MMOL/L (ref 9–17)
BACTERIA: NORMAL
BASOPHILS # BLD: 0 % (ref 0–2)
BASOPHILS ABSOLUTE: 0 K/UL (ref 0–0.2)
BILIRUBIN URINE: NEGATIVE
BUN BLDV-MCNC: 15 MG/DL (ref 6–20)
BUN/CREAT BLD: 24 (ref 9–20)
CALCIUM SERPL-MCNC: 9.4 MG/DL (ref 8.6–10.4)
CASTS UA: NORMAL /LPF
CHLORIDE BLD-SCNC: 107 MMOL/L (ref 98–107)
CO2: 22 MMOL/L (ref 20–31)
COLOR: YELLOW
COMMENT UA: NORMAL
CREAT SERPL-MCNC: 0.63 MG/DL (ref 0.5–0.9)
CRYSTALS, UA: NORMAL /HPF
D-DIMER QUANTITATIVE: 0.32 MG/L FEU (ref 0–0.5)
DIFFERENTIAL TYPE: YES
EOSINOPHILS RELATIVE PERCENT: 1 % (ref 0–5)
EPITHELIAL CELLS UA: NORMAL /HPF
GFR AFRICAN AMERICAN: >60 ML/MIN
GFR NON-AFRICAN AMERICAN: >60 ML/MIN
GFR SERPL CREATININE-BSD FRML MDRD: ABNORMAL ML/MIN/{1.73_M2}
GFR SERPL CREATININE-BSD FRML MDRD: ABNORMAL ML/MIN/{1.73_M2}
GLUCOSE BLD-MCNC: 97 MG/DL (ref 70–99)
GLUCOSE URINE: NEGATIVE
HCG(URINE) PREGNANCY TEST: NEGATIVE
HCT VFR BLD CALC: 36.5 % (ref 36–46)
HEMOGLOBIN: 12.5 G/DL (ref 12–16)
IMMATURE GRANULOCYTES: ABNORMAL %
KETONES, URINE: NEGATIVE
LEUKOCYTE ESTERASE, URINE: NEGATIVE
LYMPHOCYTES # BLD: 32 % (ref 15–40)
MCH RBC QN AUTO: 31.5 PG (ref 26–34)
MCHC RBC AUTO-ENTMCNC: 34.1 G/DL (ref 31–37)
MCV RBC AUTO: 92.4 FL (ref 80–100)
MONOCYTES # BLD: 6 % (ref 4–8)
MUCUS: NORMAL
NITRITE, URINE: NEGATIVE
NRBC AUTOMATED: ABNORMAL PER 100 WBC
OTHER OBSERVATIONS UA: NORMAL
PDW BLD-RTO: 13.5 % (ref 12.1–15.2)
PH UA: 7 (ref 5–8)
PLATELET # BLD: 245 K/UL (ref 140–450)
PLATELET ESTIMATE: ABNORMAL
PMV BLD AUTO: ABNORMAL FL (ref 6–12)
POTASSIUM SERPL-SCNC: 3.6 MMOL/L (ref 3.7–5.3)
PROTEIN UA: NEGATIVE
RBC # BLD: 3.96 M/UL (ref 4–5.2)
RBC # BLD: ABNORMAL 10*6/UL
RBC UA: NORMAL /HPF (ref 0–2)
RENAL EPITHELIAL, UA: NORMAL /HPF
SEG NEUTROPHILS: 61 % (ref 47–75)
SEGMENTED NEUTROPHILS ABSOLUTE COUNT: 3.7 K/UL (ref 2.5–7)
SODIUM BLD-SCNC: 142 MMOL/L (ref 135–144)
SPECIFIC GRAVITY UA: 1 (ref 1–1.03)
TRICHOMONAS: NORMAL
TROPONIN INTERP: NORMAL
TROPONIN T: <0.03 NG/ML
TROPONIN, HIGH SENSITIVITY: NORMAL NG/L (ref 0–14)
TSH SERPL DL<=0.05 MIU/L-ACNC: 3.14 MIU/L (ref 0.3–5)
TURBIDITY: CLEAR
URINE HGB: NEGATIVE
UROBILINOGEN, URINE: NORMAL
WBC # BLD: 6.1 K/UL (ref 3.5–11)
WBC # BLD: ABNORMAL 10*3/UL
WBC UA: NORMAL /HPF
YEAST: NORMAL

## 2019-05-20 PROCEDURE — 84484 ASSAY OF TROPONIN QUANT: CPT

## 2019-05-20 PROCEDURE — 71045 X-RAY EXAM CHEST 1 VIEW: CPT

## 2019-05-20 PROCEDURE — 80048 BASIC METABOLIC PNL TOTAL CA: CPT

## 2019-05-20 PROCEDURE — 6370000000 HC RX 637 (ALT 250 FOR IP): Performed by: EMERGENCY MEDICINE

## 2019-05-20 PROCEDURE — 99285 EMERGENCY DEPT VISIT HI MDM: CPT

## 2019-05-20 PROCEDURE — 93005 ELECTROCARDIOGRAM TRACING: CPT

## 2019-05-20 PROCEDURE — 84703 CHORIONIC GONADOTROPIN ASSAY: CPT

## 2019-05-20 PROCEDURE — 85025 COMPLETE CBC W/AUTO DIFF WBC: CPT

## 2019-05-20 PROCEDURE — 85379 FIBRIN DEGRADATION QUANT: CPT

## 2019-05-20 PROCEDURE — 84443 ASSAY THYROID STIM HORMONE: CPT

## 2019-05-20 PROCEDURE — 81001 URINALYSIS AUTO W/SCOPE: CPT

## 2019-05-20 RX ORDER — POTASSIUM CHLORIDE 750 MG/1
10 TABLET, EXTENDED RELEASE ORAL 2 TIMES DAILY
Qty: 20 TABLET | Refills: 0 | Status: SHIPPED | OUTPATIENT
Start: 2019-05-20 | End: 2019-06-25 | Stop reason: ALTCHOICE

## 2019-05-20 RX ORDER — POTASSIUM CHLORIDE 750 MG/1
40 TABLET, FILM COATED, EXTENDED RELEASE ORAL ONCE
Status: COMPLETED | OUTPATIENT
Start: 2019-05-20 | End: 2019-05-20

## 2019-05-20 RX ADMIN — POTASSIUM CHLORIDE 40 MEQ: 750 TABLET, FILM COATED, EXTENDED RELEASE ORAL at 20:26

## 2019-05-20 NOTE — ED PROVIDER NOTES
eMERGENCY dEPARTMENT eNCOUnter        [address] [facility]    Chief Complaint   Patient presents with    Palpitations     Pt states that for the past couple of days that she's been having palpitations and SOB; Has had this once before that lasted approx a day;Pt also states that she works night shift and is very tired and exhausted; Pt also states that she was having some constipation and gave herself an enema and thinks that her elecotrolytes may be off;        HPI    Mirta Gonzalez is a 29 y.o. female who presents to ED from home. By car. With complaint of palpitations. Onset since yesterday. Intensity of symptoms off and on. Location of symptoms: heart. Patient denies shortness of breath denies chest pain. Patient states that she has been working nights and she she has not been able to sleep well. No know history of anxiety. She has a history of heart murmurs a child. REVIEW OF SYSTEMS    All systems reviewed and positives are in the HPI. PAST MEDICAL HISTORY    Past Medical History:   Diagnosis Date    Heart murmur     at birth   [facility]raines        SURGICAL HISTORY    Past Surgical History:   Procedure Laterality Date    APPENDECTOMY      OVARY REMOVAL Left     TYMPANOSTOMY TUBE PLACEMENT         CURRENT MEDICATIONS    Current Outpatient Rx   Medication Sig Dispense Refill    potassium chloride (KLOR-CON M) 10 MEQ extended release tablet Take 1 tablet by mouth 2 times daily 20 tablet 0    topiramate (TOPAMAX) 50 MG tablet Take 1 tablet by mouth nightly 30 tablet 5    SUMAtriptan (IMITREX) 50 MG tablet TAKE 1 TABLET BY MOUTH once AS NEEDED FOR MIGRAINE, may repeat in ONE HOUR if needed.  Max dose 200 mg in 24 HOURS period  2    Multiple Vitamin (THERA/BETA-CAROTENE) TABS Take by mouth      cyclobenzaprine (FLEXERIL) 5 MG tablet TAKE 1 TABLET BY MOUTH THREE TIMES DAILY AS NEEDED for muscle spasms      promethazine (PHENERGAN) 12.5 MG tablet Take 1 tablet by mouth every 6 hours as clear to auscultation bilaterally   Cardiovascular:  Regular rhythm no murmurs   GI:  Soft, nondistended, normal bowel sounds, nontender, no organomegaly, no mass, no rebound, no guarding   Musculoskeletal:  No edema, no tenderness, no deformities. Back- no tenderness   Integument:  Well hydrated, no rash   Neurologic:  Alert & oriented x 3, no focal deficits noted     EKG    Sinus bradycardia rate of 56 changes. RADIOLOGY/PROCEDURES    . wet    Labs  Labs Reviewed   CBC WITH AUTO DIFFERENTIAL - Abnormal; Notable for the following components:       Result Value    RBC 3.96 (*)     All other components within normal limits   BASIC METABOLIC PANEL - Abnormal; Notable for the following components:    Bun/Cre Ratio 24 (*)     Potassium 3.6 (*)     All other components within normal limits   TROPONIN   D-DIMER, QUANTITATIVE   TSH WITHOUT REFLEX   URINALYSIS WITH MICROSCOPIC   PREGNANCY, URINE           Summation      Patient Course: Patient feels better. Patient will be sent home. The warning signs were discussed. Return to ED if worse. Patient is given supplemental potassium in ED and for the next 10 days. Extra daily servings of fruits were recommended. ED Medications administered this visit:    Medications   potassium chloride (KLOR-CON) extended release tablet 40 mEq (40 mEq Oral Given 5/20/19 2026)       New Prescriptions from this visit:    New Prescriptions    POTASSIUM CHLORIDE (KLOR-CON M) 10 MEQ EXTENDED RELEASE TABLET    Take 1 tablet by mouth 2 times daily       Follow-up:  Hon[facility] Lee [place] [address] [address] [phone]    In 3 days  As needed, If symptoms worsen        Final Impression:   1. Palpitations    2.  Hypokalemia               (Please note that portions of this note were completed with a voice recognition program.  Efforts were made to edit the dictations but occasionally words are mis-transcribed.)      Darrick Christianson MD  05/20/19 2032

## 2019-05-21 ENCOUNTER — TELEPHONE (OUTPATIENT)
Dept: FAMILY MEDICINE CLINIC | Age: 34
End: 2019-05-21

## 2019-05-21 LAB
EKG ATRIAL RATE: 56 BPM
EKG P AXIS: 74 DEGREES
EKG P-R INTERVAL: 166 MS
EKG Q-T INTERVAL: 432 MS
EKG QRS DURATION: 86 MS
EKG QTC CALCULATION (BAZETT): 416 MS
EKG R AXIS: 84 DEGREES
EKG T AXIS: 80 DEGREES
EKG VENTRICULAR RATE: 56 BPM

## 2019-05-21 NOTE — ED NOTES
Pt arrives today complaining of palpitations and fatigue. 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 Was concerned that she gave herself an enema yesterday and was worried that her electrolytes may have become imbalanced. Pt was found to be slightly hypokalemic and was medicated for same. Was given instructions on foods high in potassium and was given a prescription to supplement her low potassium for the next ten days. Pt had no further questions and states she will try to get a adequate amount of rest and follow up with pcp. No further questions.      Rodolfo Parson RN  05/20/19 [id number]

## 2019-05-21 NOTE — TELEPHONE ENCOUNTER
[facility] ([facility]) ED Follow up Call    Reason for ED visit:  Palpitations and hypokalemia    5/21/2019             FU appts/Provider:    Future Appointments   Date Time Provider Partha Quintana   5/22/2019  9:40 AM [facility]         VOICEMAIL DOCUMENTATION - ERASE IF NOT USED  Hi, this message is for Reyna. This is Avni Bland from [facility] office. Just calling to see how you are doing after your recent visit to the Emergency Room. [facility] wants to make sure you were able to fill any prescriptions and that you understand your discharge instructions. Please return our call if you need to make a follow up appointment with your provider or have any further needs. Our phone number is [phone]. Have a great day.

## 2019-05-22 ENCOUNTER — OFFICE VISIT (OUTPATIENT)
Dept: FAMILY MEDICINE CLINIC | Age: 34
End: 2019-05-22
Payer: COMMERCIAL

## 2019-05-22 VITALS
DIASTOLIC BLOOD PRESSURE: 70 MMHG | OXYGEN SATURATION: 100 % | HEIGHT: 64 IN | WEIGHT: 133 LBS | BODY MASS INDEX: 22.71 KG/M2 | HEART RATE: 64 BPM | SYSTOLIC BLOOD PRESSURE: 120 MMHG

## 2019-05-22 DIAGNOSIS — R07.89 CHEST HEAVINESS: ICD-10-CM

## 2019-05-22 DIAGNOSIS — R06.09 DYSPNEA ON EXERTION: Primary | ICD-10-CM

## 2019-05-22 DIAGNOSIS — Z63.79 STRESS DUE TO ILLNESS OF FAMILY MEMBER: ICD-10-CM

## 2019-05-22 DIAGNOSIS — E87.6 HYPOKALEMIA: ICD-10-CM

## 2019-05-22 PROCEDURE — 99214 OFFICE O/P EST MOD 30 MIN: CPT | Performed by: FAMILY MEDICINE

## 2019-05-22 ASSESSMENT — PATIENT HEALTH QUESTIONNAIRE - PHQ9
SUM OF ALL RESPONSES TO PHQ QUESTIONS 1-9: 0
2. FEELING DOWN, DEPRESSED OR HOPELESS: 0
1. LITTLE INTEREST OR PLEASURE IN DOING THINGS: 0
SUM OF ALL RESPONSES TO PHQ9 QUESTIONS 1 & 2: 0
SUM OF ALL RESPONSES TO PHQ QUESTIONS 1-9: 0

## 2019-05-22 ASSESSMENT — ENCOUNTER SYMPTOMS: EYES NEGATIVE: 1

## 2019-05-22 NOTE — PROGRESS NOTES
Name: Rodríguez Nina  : 1985         Chief Complaint:     Chief Complaint   Patient presents with    Follow-Up from Hospital    Bradycardia       History of Present Illness:      Rodríguez Nina is a 29 y.o.  female who presents with Follow-Up from Hospital and Bradycardia      HPI     Palpitations onset , really bad that day and the next. Was waking her up overnight . Thought maybe electrolyte imbalance so she drank 2-3 bottles of gatorade. Eventually went to ER and had testing showing hypokalemia. Was given potassium in the ER and rx for home. Things have gotten better but still feels a heaviness in chest, hard to breathe. Still some flutters but not as intense as they had been, not waking her at night anymore. Had been lasting for at least a minute but now it's just a few seconds, feels like a butterfly and then is gone.  she had had a migraine and took imitrex. Past few days has had headaches but not migraines, not taking any med for them. Has decreased caffeine intake to <1 cup per day. Feels a little swollen which she thinks may be from sodium content in gatorade. Eating normally. Yesterday mid day at her grandma's started feeling very poorly, short of breath, was able to go  her kids but then had to lay down for about 45 minutes, rested on the couch and may have fallen asleep just for a few minutes. Laid around all evening and then slept 10h overnight. Just started KCl supplement last night. Normal oral intake. About 2 wks ago pt and other family members had a mild illness. Pt had headache and body aches. 2-yo son had extremely runny nose. Nobody had fevers. Pt contacted me after appt and said her mom has had strep in the past couple wks and seems to be having trouble getting rid of it. Patient denies any unusual stress but does say that her dad recently had to stop treatment for his lung cancer. He has had the cancer for 2 years.   She has been unable to visit him (in [place]) because his girlfriend doesn't want to let anyone come to their house. Past Medical History:     Past Medical History:   Diagnosis Date    Heart murmur     at birth   [facility] Migraines         Past Surgical History:     Past Surgical History:   Procedure Laterality Date    APPENDECTOMY      OVARY REMOVAL Left     TYMPANOSTOMY TUBE PLACEMENT          Medications:       Prior to Admission medications    Medication Sig Start Date End Date Taking? Authorizing Provider   potassium chloride (KLOR-CON M) 10 MEQ extended release tablet Take 1 tablet by mouth 2 times daily 5/20/19  Yes Darrick Christianson MD   topiramate (TOPAMAX) 50 MG tablet Take 1 tablet by mouth nightly 4/8/19  Yes Honorio Lee DO   SUMAtriptan (IMITREX) 50 MG tablet TAKE 1 TABLET BY MOUTH once AS NEEDED FOR MIGRAINE, may repeat in ONE HOUR if needed. Max dose 200 mg in 24 HOURS period 1/3/19  Yes Historical Provider, MD   Multiple Vitamin (THERA/BETA-CAROTENE) TABS Take by mouth   Yes Historical Provider, MD   promethazine (PHENERGAN) 12.5 MG tablet Take 1 tablet by mouth every 6 hours as needed for Nausea 6/5/18  Yes Honorio Lee DO        Allergies:       Patient has no known allergies. Social History:     Tobacco:    reports that she has never smoked. She has never used smokeless tobacco.  Alcohol:      reports that she drinks alcohol. Drug Use:  reports that she does not use drugs. Family History:     Family History   Problem Relation Age of Onset    Hypertension Mother        Review of Systems:     Positive and Negative as described in HPI    Review of Systems   Constitutional: Positive for fatigue. Negative for appetite change and fever. HENT: Negative. Eyes: Negative. Physical Exam:     Vitals:  /70   Pulse 64   Ht 5' 4\" (1.626 m)   Wt 133 lb (60.3 kg)   LMP 05/10/2019   SpO2 100%   BMI 22.83 kg/m²   Physical Exam   Constitutional: She is oriented to person, place, and time.  She appears well-developed and well-nourished. No distress. HENT:   Head: Normocephalic and atraumatic. Right Ear: Hearing, tympanic membrane and ear canal normal.   Left Ear: Hearing, tympanic membrane and ear canal normal.   Nose: Nose normal. No mucosal edema or rhinorrhea. Mouth/Throat: Oropharynx is clear and moist and mucous membranes are normal.   Eyes: Pupils are equal, round, and reactive to light. Conjunctivae and EOM are normal.   Neck: Neck supple. No thyroid mass and no thyromegaly present. Cardiovascular: Normal rate, regular rhythm, S1 normal, S2 normal and normal heart sounds. Exam reveals no friction rub. No murmur heard. No peripheral edema. Apical heart rate 60, no ectopy. Pulmonary/Chest: Effort normal and breath sounds normal.   Abdominal: Soft. Bowel sounds are normal. There is no hepatosplenomegaly. There is no tenderness. Musculoskeletal:   Normal tone and bulk, normal gait. Lymphadenopathy:     She has no cervical adenopathy. Neurological: She is alert and oriented to person, place, and time. She has normal strength. Skin: Skin is warm and dry. No rash noted. Psychiatric: She has a normal mood and affect. Her behavior is normal. Judgment normal.   Nursing note and vitals reviewed.       Data:     Lab Results   Component Value Date     05/20/2019    K 3.6 05/20/2019     05/20/2019    CO2 22 05/20/2019    BUN 15 05/20/2019    CREATININE 0.63 05/20/2019    GLUCOSE 97 05/20/2019    GLUCOSE 74 12/31/2011    PROT 7.7 04/10/2015    LABALBU 4.9 04/10/2015    BILITOT 0.53 04/10/2015    ALKPHOS 45 04/10/2015    AST 22 04/10/2015    ALT 22 04/10/2015     Lab Results   Component Value Date    WBC 6.1 05/20/2019    RBC 3.96 05/20/2019    RBC 4.26 12/31/2011    HGB 12.5 05/20/2019    HCT 36.5 05/20/2019    MCV 92.4 05/20/2019    MCH 31.5 05/20/2019    MCHC 34.1 05/20/2019    RDW 13.5 05/20/2019     05/20/2019     12/31/2011    MPV NOT REPORTED 05/20/2019     Lab Results   Component Value Date    TSH 3.14 05/20/2019     Lab Results   Component Value Date    CHOL 136 10/19/2017    HDL 62 10/19/2017         Assessment & Plan:        Diagnosis Orders   1. Dyspnea on exertion  Echocardiogram complete   2. Chest heaviness  Echocardiogram complete   3. Hypokalemia     4. Stress due to illness of family member     palpitations, GAXIOLA and chest heaviness for past few days. Reviewed ER notes with thorough workup, only abnormalities mild hypokalemia and t wave inversion in one lead. ddx anxiety, myocarditis (recent viral infection), pericarditis. Echo ordered. Discussed poss holter, although her palpitations have already greatly improved. Continue good hydration and work on stress mgmt, prayer. Follow closely. Requested Prescriptions      No prescriptions requested or ordered in this encounter       Patient Instructions     SURVEY:    You may be receiving a survey from [employer] regarding your visit today. Please complete the survey to enable us to provide the highest quality of care to you and your family. If you cannot score us as very good on any question, please call the office to discuss how we could have made your experience exceptional.     Thank you. Sydnee Schwarz received counseling on the following healthy behaviors: medication adherence  Reviewed prior labs and health maintenance. Continue current medications, diet and exercise. Discussed use, benefit, and side effects of prescribed medications. Barriers to medication compliance addressed. Patient given educational materials - see patient instructions. All patient questions answered. Patient voiced understanding.      Electronically signed by Alam Watson DO on 5/22/2019 at 10:44 PM   [address] [address]  Dept: [phone]

## 2019-05-30 ENCOUNTER — HOSPITAL ENCOUNTER (OUTPATIENT)
Dept: NON INVASIVE DIAGNOSTICS | Age: 34
Discharge: HOME | End: 2019-05-30
Payer: COMMERCIAL

## 2019-05-30 DIAGNOSIS — R06.09 DYSPNEA ON EXERTION: ICD-10-CM

## 2019-05-30 DIAGNOSIS — R07.89 CHEST HEAVINESS: ICD-10-CM

## 2019-05-30 LAB
LV EF: 60 %
LVEF MODALITY: NORMAL

## 2019-05-30 PROCEDURE — 93306 TTE W/DOPPLER COMPLETE: CPT

## 2019-06-03 ENCOUNTER — TELEPHONE (OUTPATIENT)
Dept: FAMILY MEDICINE CLINIC | Age: 34
End: 2019-06-03

## 2019-06-03 ENCOUNTER — TELEPHONE (OUTPATIENT)
Dept: CARDIOLOGY CLINIC | Age: 34
End: 2019-06-03

## 2019-06-03 DIAGNOSIS — R93.1 ABNORMAL ECHOCARDIOGRAM: ICD-10-CM

## 2019-06-03 DIAGNOSIS — R00.2 PALPITATION: Primary | ICD-10-CM

## 2019-06-03 DIAGNOSIS — R06.09 DYSPNEA ON EXERTION: ICD-10-CM

## 2019-06-03 DIAGNOSIS — E55.9 VITAMIN D DEFICIENCY: ICD-10-CM

## 2019-06-03 DIAGNOSIS — R00.2 PALPITATIONS: ICD-10-CM

## 2019-06-03 DIAGNOSIS — R93.1 ABNORMAL ECHOCARDIOGRAM: Primary | ICD-10-CM

## 2019-06-03 DIAGNOSIS — R06.02 SHORTNESS OF BREATH: ICD-10-CM

## 2019-06-03 DIAGNOSIS — E78.2 MIXED HYPERLIPIDEMIA: ICD-10-CM

## 2019-06-03 NOTE — TELEPHONE ENCOUNTER
----- Message from Lena Julian DO sent at 6/2/2019 10:11 PM EDT -----  Discussed with pt. Please refer to Dr Donny Kaplan for palpitations, dyspnea on exertion, and abnormal echo.

## 2019-06-21 ENCOUNTER — HOSPITAL ENCOUNTER (OUTPATIENT)
Age: 34
Discharge: HOME | End: 2019-06-21
Payer: COMMERCIAL

## 2019-06-21 DIAGNOSIS — R00.2 PALPITATIONS: ICD-10-CM

## 2019-06-21 DIAGNOSIS — E55.9 VITAMIN D DEFICIENCY: ICD-10-CM

## 2019-06-21 DIAGNOSIS — R93.1 ABNORMAL ECHOCARDIOGRAM: ICD-10-CM

## 2019-06-21 DIAGNOSIS — R06.02 SHORTNESS OF BREATH: ICD-10-CM

## 2019-06-21 DIAGNOSIS — E78.2 MIXED HYPERLIPIDEMIA: ICD-10-CM

## 2019-06-21 LAB
ALBUMIN SERPL-MCNC: 4.6 G/DL (ref 3.5–5.2)
ALBUMIN/GLOBULIN RATIO: NORMAL (ref 1–2.5)
ALP BLD-CCNC: 40 U/L (ref 35–104)
ALT SERPL-CCNC: 19 U/L (ref 5–33)
ANION GAP SERPL CALCULATED.3IONS-SCNC: 11 MMOL/L (ref 9–17)
AST SERPL-CCNC: 21 U/L
BILIRUB SERPL-MCNC: 0.47 MG/DL (ref 0.3–1.2)
BUN BLDV-MCNC: 12 MG/DL (ref 6–20)
BUN/CREAT BLD: 16 (ref 9–20)
CALCIUM SERPL-MCNC: 10.3 MG/DL (ref 8.6–10.4)
CHLORIDE BLD-SCNC: 107 MMOL/L (ref 98–107)
CHOLESTEROL/HDL RATIO: 2
CHOLESTEROL: 164 MG/DL
CO2: 23 MMOL/L (ref 20–31)
CREAT SERPL-MCNC: 0.77 MG/DL (ref 0.5–0.9)
GFR AFRICAN AMERICAN: >60 ML/MIN
GFR NON-AFRICAN AMERICAN: >60 ML/MIN
GFR SERPL CREATININE-BSD FRML MDRD: NORMAL ML/MIN/{1.73_M2}
GFR SERPL CREATININE-BSD FRML MDRD: NORMAL ML/MIN/{1.73_M2}
GLUCOSE BLD-MCNC: 92 MG/DL (ref 70–99)
HDLC SERPL-MCNC: 84 MG/DL
LDL CHOLESTEROL: 73 MG/DL (ref 0–130)
MAGNESIUM: 2.2 MG/DL (ref 1.6–2.6)
PATIENT FASTING?: YES
POTASSIUM SERPL-SCNC: 4.3 MMOL/L (ref 3.7–5.3)
SODIUM BLD-SCNC: 141 MMOL/L (ref 135–144)
TOTAL PROTEIN: 7.2 G/DL (ref 6.4–8.3)
TRIGL SERPL-MCNC: 35 MG/DL
VITAMIN D 25-HYDROXY: 23.2 NG/ML (ref 30–100)
VLDLC SERPL CALC-MCNC: NORMAL MG/DL (ref 1–30)

## 2019-06-21 PROCEDURE — 83735 ASSAY OF MAGNESIUM: CPT

## 2019-06-21 PROCEDURE — 80053 COMPREHEN METABOLIC PANEL: CPT

## 2019-06-21 PROCEDURE — 82306 VITAMIN D 25 HYDROXY: CPT

## 2019-06-21 PROCEDURE — 36415 COLL VENOUS BLD VENIPUNCTURE: CPT

## 2019-06-21 PROCEDURE — 80061 LIPID PANEL: CPT

## 2019-06-25 ENCOUNTER — OFFICE VISIT (OUTPATIENT)
Dept: CARDIOLOGY CLINIC | Age: 34
End: 2019-06-25
Payer: COMMERCIAL

## 2019-06-25 VITALS
OXYGEN SATURATION: 100 % | SYSTOLIC BLOOD PRESSURE: 120 MMHG | DIASTOLIC BLOOD PRESSURE: 70 MMHG | HEART RATE: 65 BPM | WEIGHT: 134 LBS | BODY MASS INDEX: 23 KG/M2

## 2019-06-25 DIAGNOSIS — R07.9 CHEST PAIN, UNSPECIFIED TYPE: ICD-10-CM

## 2019-06-25 DIAGNOSIS — R06.02 SHORTNESS OF BREATH: ICD-10-CM

## 2019-06-25 DIAGNOSIS — R00.2 PALPITATIONS: Primary | ICD-10-CM

## 2019-06-25 PROCEDURE — 99204 OFFICE O/P NEW MOD 45 MIN: CPT | Performed by: INTERNAL MEDICINE

## 2019-06-25 NOTE — LETTER
Lila Lopez M.D. [address] [place] [address] [address] [place] [address]  [phone]        2019        Jagdeep Conroy, DO  [address] [address]    RE:   Marylee Cantor  :  1985    Dear Dr. Boris Lo:    CHIEF COMPLAINT:  1.  Palpitations. 2.  Shortness of breath. 3.  Chest heaviness, all new since . 4.  Abnormal echocardiogram.    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 She is here with her mother, Verner Salen, because of palpitations. She has never had a cardiac problem in the past, except for a murmur as a child. She has never seen a cardiologist.    [facility], she began developing palpitations. These would be enough that they would wake her up at night. She described her palpitations as short episodes of very rapid heartbeats. When she reproduced them by tapping, they seemed to be in the 150 to 180 range. They had been happening 3 to 4 times a week although in the last week, they have been better and happening more infrequent. She also developed some shortness of breath that was worse with exertion. She has some chest heaviness that was occasionally occurring with activity but occasionally would be at rest.  Her energy had markedly decreased since . She has also been having some dizziness or lightheadedness. Her blood pressure is usually on the low side and her pulse is usually on the low side. She also had chills and diaphoresis that would come and go both during the day and at night. She has not checked to see if she has had a fever. She was seen in the emergency room for her palpitations and shortness of breath on 2019.   She had been working in the night shift although she has done that for the last 4 years. However, she would become very tired and exhausted. She saw Dr. Mima Richards on 05/22/2019, in followup. At that time, she was also very exhausted, which is unusual for her. She was having episodes of bradycardia with heart rates in the 50s. EKG during her ER visit was mildly abnormal with T-wave inversion in ADL. An echocardiogram and Holter monitor was ordered. Echocardiogram was done on 05/30/2019. Her EF was normal in the 60% range. Her chamber sizes were also normal.  Aortic valve was normal.  She did have a mildly thickened mitral valve and there was a slight bowing of the anterior leaflet that was tending towards mitral valve prolapse although did not quiet meet criteria. She did have trivial MR. I could not absolutely rule out a vegetation. Holter monitor was ordered and was done. This showed PACs and occasional PVCs with no runs of tachycardia. She denies syncope or near syncope. Her energy level is slightly better but still not back to baseline. She has continued to have her episodes of palpitations with very rapid heartbeats. Of note, she did not have any severe episodes of palpitations when she was wearing the Holter monitor. CARDIAC RISK FACTORS:  Hypertension:  Negative. Hyperlipidemia:  Negative. Peripheral Vascular Disease:  Negative. Other Family Members:  Negative. Smoking:  Negative. PAST MEDICAL HISTORY:  1. She has a history of migraines for many years, which have been fairly well controlled. She is on Topamax for her migraines and does take Imitrex occasionally. 2.  She has had heart murmurs as a child. 3.  Tympanostomy tube placed as a child. 4.  Left ovary has been removed. 5.  She has had an appendectomy. FAMILY HISTORY:  No significant heart disease in the family. She has 4 sisters with no cardiac disease. Mother and father are  but neither have any heart disease. SOCIAL HISTORY:  She is 29years old, did work for Dr. 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  works at [employer]. She has 3 children, an 6year-old girl, 10year-old girl and a 3-1/2year-old boy. Does not smoke. Drinks alcohol occasionally. Drinks 2 cups of coffee a day. REVIEW OF SYSTEMS:  Cardiac as above. Other systems reviewed including constitutional, eyes, ears, nose and throat, cardiovascular, respiratory, GI, , musculoskeletal, integumentary, neurologic, psychiatric, endocrine, hematologic and allergic/immunologic are negative except for what is described above. No weight loss or weight gain. No change in bowel habits, no blood in stools. She has had diaphoresis and chills, which come and go. She continues to have the palpitations. She has been markedly tired and fatigued. She does have mild joint pain in her fingers, which was somewhat new for her although she has no morning symptoms to indicate an autoimmune or rheumatoid arthritis picture. She has no new rashes consistent with lupus texture. PHYSICAL EXAMINATION:   VITAL SIGNS:  Her blood pressure was 120/70 with a heart rate of 65 and regular. Respiratory rate 18. O2 saturation was 100%. Weight 134 pounds. GENERAL:  She is a pleasant 28-year-old female. Denied pain. She was oriented to person, place and time. Answered questions appropriately. SKIN:  No unusual skin changes. HEENT:  The pupils are equally round and reactive to light and accommodation. Extraocular movements were intact. Mucous membranes were dry. NECK:  No JVD. Good carotid pulses. No carotid bruits. No lymphadenopathy or thyromegaly. CARDIOVASCULAR EXAM:  S1 and S2 were normal.  No S3 or S4. Soft systolic blowing type murmur. No diastolic murmur. PMI was normal.  No lift, thrust, or pericardial friction rub. LUNGS:  Quite clear to auscultation and percussion. ABDOMEN:  Soft and nontender. Good bowel sounds. The aorta was not enlarged. No hepatomegaly, splenomegaly. EXTREMITIES:  Good femoral pulses. Good pedal pulses. No pedal edema. Skin was warm and dry. No calf tenderness. Nail beds pink. Good cap refill. Some new joint pain in her hands although no morning stiffness to indicate a autoimmune disorder such as rheumatoid arthritis. PULSES:  Bilateral symmetrical radial, brachial and carotid pulses. No carotid bruits. Good femoral and pedal pulses. NEUROLOGIC EXAM:  Within normal limits. PSYCHIATRIC EXAM:  Within normal limits. LABORATORY DATA:  Her sodium was 141, potassium 4.3, BUN 12, creatinine 0.77, GFR greater than 60, magnesium was 2.2, calcium was 10.3. Cholesterol 164 with an HDL of 84, LDL 73, triglycerides 35. ALT was 19, AST was 21. Vitamin D 23.2. Urinalysis was unremarkable. White count 6.1, hemoglobin 12.5 with a platelet count 326,845. TSH was normal at 3.14. Echocardiogram showed normal LV size and function with normal chamber sizes but slight bowing of the mitral valve with a tendency towards mitral valve prolapse although does not quite meet criteria. I could not absolutely rule out a vegetation. She had very mild mitral regurgitation. IMPRESSION:  1.  Marked loss of energy and fatigue since Easter although slightly better in the last week. 2.  Palpitations, which started approximately at [facility] time with her reproducing palpitations with short bursts of heart rates in the 150 to 180 range, consistent with short bursts of SVT. 3.  Unremarkable Holter monitor showing PACs but no runs of tachycardia although she states that she had no severe episodes at that time.   4.  Sweats and chills and loss of energy since Easter and question whether or not she had a very myocarditis that could have been contributing to her palpitations although her EF now is normal. 5.  Slight bowing of the mitral valve although nondiagnostic for mitral valve prolapse. 6.  She does have a new joint pain but no real associated morning stiffness to indicate rheumatoid arthritis. Also, she has not had any new skin rashes or lesions to indicate a lupus abnormality although we are checking her CAIT. PLAN:  1.  A 30-day event recorder. 2.  Treadmill stress test.  3.  We will draw CAIT, sed rate, CRP and blood cultures for completeness sake. 4.  We will meet with her in approximately 8 weeks to go over the results of her 30-day event recorder to see if we can capture any of her episodes, where her heartbeat is very rapid. DISCUSSION:  Mrs. Rashmi Martinez had been doing well until Easter, and at that time, developed marked fatigue and development of new palpitations, which she had not had before. When she reproduces them, she describes them as being very rapid in the 150 to 180 range consistent with short bursts of SVT. In addition, she had been very tired. She had been having sweats and chills. I questioned whether she had a myocarditis at that time. She had no LV dysfunction on her echocardiogram, and therefore, it would have been mild but could have been contributing to her episodes of tachycardia and her fatigue. Her mitral valve is mildly thickened and I could not absolutely rule out a vegetation. She did have sweats and chills although they are better recently. I will do an CAIT and sed rate and CRP along with blood cultures although I doubt that this is a subacute bacterial endocarditis. We will do a 30-day event recorder to see if we can capture any of her episodes of rapid heartbeat. If this was myocarditis, she should recover with no sequelae as again her LV function is normal.  I am hoping her symptoms will gradually fade away. They are better over the last week than what they have been previously. I would repeat an echocardiogram in 1 to 2 years to monitor her mitral valve and it would be reasonable, if it is unchanged, every 2 to 3 years thereafter to do echos again to monitor her mitral valve. It does not meet criteria for mitral valve prolapse but I would want to follow it. She, at this time, has only very mild mitral regurgitation. By the way, I do not think her mild bowing of the mitral valve has anything to do with her palpitations. Thank you very much for allowing me the privilege of seeing Mrs. Milner. If you have any questions on my thoughts, please do not hesitate to contact me.      Sincerely,        Simran Infante    D: 06/27/2019 4:28:11     T: 06/27/2019 6:09:33     [id number]  Job#: [id number]   Doc#: [id number]

## 2019-06-25 NOTE — PATIENT INSTRUCTIONS
Will set up for 48 hr holter monitor/treadmill stress test  Will get blood cultures/preet/sed rate/crp   Will follow up 5-6 weeks (after testing)

## 2019-06-25 NOTE — PROGRESS NOTES
Ov Dr. Dominic Michaels for new pt   Here with karyna her mom  Referred by Dr. Hunter Brooks   C/o palpitations- wakiing her up at night  X 2 nights  C/o sob worse with exertion   C/o chest heaviness-  Since  but   Better last 2 weeks   Energy level  fast   Since    (+)dizziness or lightheadedness  bp runs little low and pulse low   Has had chills and sweating   Comes and goes  No fever  Had seen Dr. 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

## 2019-06-28 NOTE — PROGRESS NOTES
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  works at [employer]. She has 3 children, an 6year-old girl, 10year-old girl and a 3-1/2year-old boy. Does not smoke. Drinks alcohol occasionally. Drinks 2 cups of coffee a day. REVIEW OF SYSTEMS:  Cardiac as above. Other systems reviewed including constitutional, eyes, ears, nose and throat, cardiovascular, respiratory, GI, , musculoskeletal, integumentary, neurologic, psychiatric, endocrine, hematologic and allergic/immunologic are negative except for what is described above. No weight loss or weight gain. No change in bowel habits, no blood in stools. She has had diaphoresis and chills, which come and go. She continues to have the palpitations. She has been markedly tired and fatigued. She does have mild joint pain in her fingers, which was somewhat new for her although she has no morning symptoms to indicate an autoimmune or rheumatoid arthritis picture. She has no new rashes consistent with lupus texture. PHYSICAL EXAMINATION:   VITAL SIGNS:  Her blood pressure was 120/70 with a heart rate of 65 and regular. Respiratory rate 18. O2 saturation was 100%. Weight 134 pounds. GENERAL:  She is a pleasant 28-year-old female. Denied pain. She was oriented to person, place and time. Answered questions appropriately. SKIN:  No unusual skin changes. HEENT:  The pupils are equally round and reactive to light and accommodation. Extraocular movements were intact. Mucous membranes were dry. NECK:  No JVD. Good carotid pulses. No carotid bruits. No lymphadenopathy or thyromegaly. CARDIOVASCULAR EXAM:  S1 and S2 were normal.  No S3 or S4. Soft systolic blowing type murmur. No diastolic murmur. PMI was normal.  No lift, thrust, or pericardial friction rub. LUNGS:  Quite clear to auscultation and percussion. ABDOMEN:  Soft and nontender. Good bowel sounds. The aorta was not enlarged. No hepatomegaly, splenomegaly. EXTREMITIES:  Good femoral pulses. Good pedal pulses. No pedal edema. Skin was warm and dry. No calf tenderness. Nail beds pink. Good cap refill. Some new joint pain in her hands although no morning stiffness to indicate a autoimmune disorder such as rheumatoid arthritis. PULSES:  Bilateral symmetrical radial, brachial and carotid pulses. No carotid bruits. Good femoral and pedal pulses. NEUROLOGIC EXAM:  Within normal limits. PSYCHIATRIC EXAM:  Within normal limits. LABORATORY DATA:  Her sodium was 141, potassium 4.3, BUN 12, creatinine 0.77, GFR greater than 60, magnesium was 2.2, calcium was 10.3. Cholesterol 164 with an HDL of 84, LDL 73, triglycerides 35. ALT was 19, AST was 21. Vitamin D 23.2. Urinalysis was unremarkable. White count 6.1, hemoglobin 12.5 with a platelet count 392,062. TSH was normal at 3.14. Echocardiogram showed normal LV size and function with normal chamber sizes but slight bowing of the mitral valve with a tendency towards mitral valve prolapse although does not quite meet criteria. I could not absolutely rule out a vegetation. She had very mild mitral regurgitation. IMPRESSION:  1.  Marked loss of energy and fatigue since Easter although slightly better in the last week. 2.  Palpitations, which started approximately at [facility] time with her reproducing palpitations with short bursts of heart rates in the 150 to 180 range, consistent with short bursts of SVT. 3.  Unremarkable Holter monitor showing PACs but no runs of tachycardia although she states that she had no severe episodes at that time. 4.  Sweats and chills and loss of energy since Easter and question whether or not she had a very myocarditis that could have been contributing to her palpitations although her EF now is normal.  5.  Slight bowing of the mitral valve although nondiagnostic for mitral valve prolapse.   6.  She does have a new joint pain but no real associated morning stiffness to indicate rheumatoid arthritis. Also, she has not had any new skin rashes or lesions to indicate a lupus abnormality although we are checking her CAIT. PLAN:  1.  A 30-day event recorder. 2.  Treadmill stress test.  3.  We will draw CAIT, sed rate, CRP and blood cultures for completeness sake. 4.  We will meet with her in approximately 8 weeks to go over the results of her 30-day event recorder to see if we can capture any of her episodes, where her heartbeat is very rapid. DISCUSSION:  Mrs. Rashmi Martinez had been doing well until Easter, and at that time, developed marked fatigue and development of new palpitations, which she had not had before. When she reproduces them, she describes them as being very rapid in the 150 to 180 range consistent with short bursts of SVT. In addition, she had been very tired. She had been having sweats and chills. I questioned whether she had a myocarditis at that time. She had no LV dysfunction on her echocardiogram, and therefore, it would have been mild but could have been contributing to her episodes of tachycardia and her fatigue. Her mitral valve is mildly thickened and I could not absolutely rule out a vegetation. She did have sweats and chills although they are better recently. I will do an CAIT and sed rate and CRP along with blood cultures although I doubt that this is a subacute bacterial endocarditis. We will do a 30-day event recorder to see if we can capture any of her episodes of rapid heartbeat. If this was myocarditis, she should recover with no sequelae as again her LV function is normal.  I am hoping her symptoms will gradually fade away. They are better over the last week than what they have been previously. I would repeat an echocardiogram in 1 to 2 years to monitor her mitral valve and it would be reasonable, if it is unchanged, every 2 to 3 years thereafter to do echos again to monitor her mitral valve. It does not meet criteria for mitral valve prolapse but I would want to follow it. She, at this time, has only very mild mitral regurgitation. By the way, I do not think her mild bowing of the mitral valve has anything to do with her palpitations. Thank you very much for allowing me the privilege of seeing Mrs. Milner. If you have any questions on my thoughts, please do not hesitate to contact me.      Sincerely,        Arabella Brambila    D: 06/27/2019 4:28:11     T: 06/27/2019 6:09:33     [email or web address]  Job#: [id number]   Doc#: [id number]

## 2019-07-02 ENCOUNTER — HOSPITAL ENCOUNTER (OUTPATIENT)
Dept: NON INVASIVE DIAGNOSTICS | Age: 34
Discharge: HOME | End: 2019-07-02
Payer: COMMERCIAL

## 2019-07-02 ENCOUNTER — HOSPITAL ENCOUNTER (OUTPATIENT)
Age: 34
Discharge: HOME | End: 2019-07-02
Payer: COMMERCIAL

## 2019-07-02 DIAGNOSIS — R06.02 SHORTNESS OF BREATH: ICD-10-CM

## 2019-07-02 DIAGNOSIS — R00.2 PALPITATIONS: ICD-10-CM

## 2019-07-02 DIAGNOSIS — R07.9 CHEST PAIN, UNSPECIFIED TYPE: ICD-10-CM

## 2019-07-02 LAB
C-REACTIVE PROTEIN: <0.3 MG/L (ref 0–5)
SEDIMENTATION RATE, ERYTHROCYTE: 2 MM (ref 0–30)

## 2019-07-02 PROCEDURE — 93017 CV STRESS TEST TRACING ONLY: CPT

## 2019-07-02 PROCEDURE — 86225 DNA ANTIBODY NATIVE: CPT

## 2019-07-02 PROCEDURE — 86235 NUCLEAR ANTIGEN ANTIBODY: CPT

## 2019-07-02 PROCEDURE — 87040 BLOOD CULTURE FOR BACTERIA: CPT

## 2019-07-02 PROCEDURE — 93270 REMOTE 30 DAY ECG REV/REPORT: CPT

## 2019-07-02 PROCEDURE — 36415 COLL VENOUS BLD VENIPUNCTURE: CPT

## 2019-07-02 PROCEDURE — 86140 C-REACTIVE PROTEIN: CPT

## 2019-07-02 PROCEDURE — 85651 RBC SED RATE NONAUTOMATED: CPT

## 2019-07-03 LAB
ANA REFERENCE RANGE:: NORMAL
ANTI DNA DOUBLE STRANDED: 66 IU/ML
ANTI JO-1 IGG: 15 U/ML
ANTI RNP AB: 64 U/ML
ANTI SSA: 8 U/ML
ANTI SSB: 11 U/ML
ANTI-CENTROMERE: 18 U/ML
ANTI-SCLERODERMA: 23 U/ML
ANTI-SMITH: 12 U/ML
HISTONE ANTIBODY: 11 U/ML

## 2019-07-08 LAB
CULTURE: NORMAL
Lab: NORMAL
SPECIMEN DESCRIPTION: NORMAL

## 2019-07-17 RX ORDER — SUMATRIPTAN 50 MG/1
TABLET, FILM COATED ORAL
Qty: 9 TABLET | Refills: 2 | Status: SHIPPED | OUTPATIENT
Start: 2019-07-17 | End: 2020-02-21 | Stop reason: SDUPTHER

## 2019-08-06 ENCOUNTER — OFFICE VISIT (OUTPATIENT)
Dept: CARDIOLOGY CLINIC | Age: 34
End: 2019-08-06
Payer: COMMERCIAL

## 2019-08-06 VITALS — HEART RATE: 70 BPM | OXYGEN SATURATION: 98 % | WEIGHT: 135 LBS | BODY MASS INDEX: 23.17 KG/M2

## 2019-08-06 DIAGNOSIS — R00.2 PALPITATIONS: Primary | ICD-10-CM

## 2019-08-06 PROCEDURE — 99212 OFFICE O/P EST SF 10 MIN: CPT | Performed by: INTERNAL MEDICINE

## 2019-08-06 NOTE — PROGRESS NOTES
70s.    At the time that I saw her, we did multiple blood work, including blood cultures, CRPs, sed rates and ANAs, which were all negative. There is no evidence that she has any infection or SBE. I had a long discussion with her and a friend of hers. It does not appear to me that she has any primary cardiac problem. I feel that her \"fast heart rate\" is really sinus rhythm with accentuated contractility, consistent with increased adrenaline. I am not sure whether there is an element of a panic attack or anxiety, especially if she begins developing palpitations. However, her cardiac workup was negative. I was able to show with her and her friend that her heart rate was in sinus rhythm at a very acceptable normal rate, when she would feel her heart \"beating hard. \"    There are several options. One option is knowing that her workup was negative and knowing that she most likely will still have occasional episodes of \"hard, fast heartbeat\" and given her reassurance that there is no cardiac pathology at this time, to see if she can work through without any intervention. Another option is to use Inderal 10 mg on a p.r.n. basis. If she would feel her heart \"beating hard and fast\" take an Inderal and allow it to subside. I did recommend that she get a [employer] device so that she can recorder a rhythm strip on her iPhone when she feels her heart beating fast or hard. I have used this with a number of patients and it works well for them to be able to see their rhythm and know that it is a normal heart rate. Also, they have the option of sending that to me for me to review. She opts at this point to try conservative measures with no Inderal.  If she would develop any change in her symptoms then she is to let me know and I would be glad to see her at any time. I will see her on an as-needed basis at this time.   She and her friend seemed very relieved to know that her workup was negative and appears to be

## 2019-08-06 NOTE — PATIENT INSTRUCTIONS
Recommend getting Rose Marie at [facility]  To record heart rate and rhythm on phone   Dr. Claire Warner cell phone: [phone]

## 2020-02-21 ENCOUNTER — OFFICE VISIT (OUTPATIENT)
Dept: FAMILY MEDICINE CLINIC | Age: 35
End: 2020-02-21
Payer: COMMERCIAL

## 2020-02-21 VITALS
OXYGEN SATURATION: 98 % | BODY MASS INDEX: 23.86 KG/M2 | SYSTOLIC BLOOD PRESSURE: 112 MMHG | HEART RATE: 70 BPM | WEIGHT: 139 LBS | DIASTOLIC BLOOD PRESSURE: 70 MMHG

## 2020-02-21 PROCEDURE — 99214 OFFICE O/P EST MOD 30 MIN: CPT | Performed by: FAMILY MEDICINE

## 2020-02-21 RX ORDER — LORAZEPAM 0.5 MG/1
0.5 TABLET ORAL EVERY 8 HOURS PRN
Qty: 20 TABLET | Refills: 0 | Status: SHIPPED | OUTPATIENT
Start: 2020-02-21 | End: 2020-03-16 | Stop reason: SDUPTHER

## 2020-02-21 RX ORDER — SUMATRIPTAN 50 MG/1
TABLET, FILM COATED ORAL
Qty: 9 TABLET | Refills: 2 | Status: SHIPPED | OUTPATIENT
Start: 2020-02-21 | End: 2020-09-10 | Stop reason: SDUPTHER

## 2020-02-21 ASSESSMENT — PATIENT HEALTH QUESTIONNAIRE - PHQ9
2. FEELING DOWN, DEPRESSED OR HOPELESS: 0
SUM OF ALL RESPONSES TO PHQ9 QUESTIONS 1 & 2: 0
SUM OF ALL RESPONSES TO PHQ QUESTIONS 1-9: 0
1. LITTLE INTEREST OR PLEASURE IN DOING THINGS: 0
SUM OF ALL RESPONSES TO PHQ QUESTIONS 1-9: 0

## 2020-02-21 NOTE — PROGRESS NOTES
She is alert and oriented to person, place, and time. Psychiatric:         Mood and Affect: Mood normal.         Behavior: Behavior normal.         Judgment: Judgment normal.         Data:     Lab Results   Component Value Date     06/21/2019    K 4.3 06/21/2019     06/21/2019    CO2 23 06/21/2019    BUN 12 06/21/2019    CREATININE 0.77 06/21/2019    GLUCOSE 92 06/21/2019    GLUCOSE 74 12/31/2011    PROT 7.2 06/21/2019    LABALBU 4.6 06/21/2019    BILITOT 0.47 06/21/2019    ALKPHOS 40 06/21/2019    AST 21 06/21/2019    ALT 19 06/21/2019     Lab Results   Component Value Date    WBC 6.1 05/20/2019    RBC 3.96 05/20/2019    RBC 4.26 12/31/2011    HGB 12.5 05/20/2019    HCT 36.5 05/20/2019    MCV 92.4 05/20/2019    MCH 31.5 05/20/2019    MCHC 34.1 05/20/2019    RDW 13.5 05/20/2019     05/20/2019     12/31/2011    MPV NOT REPORTED 05/20/2019     Lab Results   Component Value Date    TSH 3.14 05/20/2019     Lab Results   Component Value Date    CHOL 164 06/21/2019    HDL 84 06/21/2019 7/2/19 ekg treadmill stress  She exercised 7 minutes and 30 seconds on accelerated Jono protocol. Her peak heart rate was 171, which was 91% of maximum predicted heart  rate. She achieved 12.6 METs, which is an excellent exercise capacity.     She had no chest pain, no ST depression. It was overall normal cardiac  stress test with no reproduction of chest pain and no arrhythmias or ST  depression with activity. Same day 48 hr holter  She wore a Holter monitor for 48 hours on 07/02/2019. We had 22  transmissions. Each transmission demonstrated sinus rhythm. Her heart  rate was between 53 and 87. She was asymptomatic. Did have mild sinus  bradycardia but not in the range that would warrant any intervention. It is an overall unremarkable Holter monitor with no significant  arrhythmias that needs treatment at this time. Assessment & Plan:        Diagnosis Orders   1.  Migraine without aura and without status migrainosus, not intractable     2. Anxiety  LORazepam (ATIVAN) 0.5 MG tablet   migraines controlled with 1-2 episodes per month even without topamax. More often in past couple wks so call if this pattern persists. Cont imitrex prn. Anxiety largely situational. Has improved with lifestyle efforts, better sleep and marlene exercise. Discussed at length, options including SSRI, buspar, prn benzo. Opts for low-dose ativan prn. Consider counseling. If needing benzo frequently would definitely recommend SSRI.    >25 min spent with pt, >50% counseling as above      Requested Prescriptions     Signed Prescriptions Disp Refills    SUMAtriptan (IMITREX) 50 MG tablet 9 tablet 2     Sig: TAKE 1 TABLET BY MOUTH once AS NEEDED FOR MIGRAINE, may repeat in ONE HOUR if needed. Max dose 200 mg in 24 HOURS period    LORazepam (ATIVAN) 0.5 MG tablet 20 tablet 0     Sig: Take 1 tablet by mouth every 8 hours as needed for Anxiety for up to 30 days. Patient Instructions     SURVEY:    You may be receiving a survey from [employer] regarding your visit today. Please complete the survey to enable us to provide the highest quality of care to you and your family. If you cannot score us a very good on any question, please call the office to discuss how we could have made your experience a very good one. Thank you. Adalid Islas received counseling on the following healthy behaviors: medication adherence  Reviewed prior labs and health maintenance. Continue current medications, diet and exercise. Discussed use, benefit, and side effects of prescribed medications. Barriers to medication compliance addressed. Patient given educational materials - see patient instructions. All patient questions answered. Patient voiced understanding.      Electronically signed by Lazara Murguia DO on 2/24/2020 at 5:18 PM   [address] [address]  Dept:

## 2020-02-21 NOTE — PATIENT INSTRUCTIONS
SURVEY:    You may be receiving a survey from [employer] regarding your visit today. Please complete the survey to enable us to provide the highest quality of care to you and your family. If you cannot score us a very good on any question, please call the office to discuss how we could have made your experience a very good one. Thank you.

## 2020-02-24 ASSESSMENT — ENCOUNTER SYMPTOMS
RESPIRATORY NEGATIVE: 1
GASTROINTESTINAL NEGATIVE: 1

## 2020-03-16 RX ORDER — LORAZEPAM 0.5 MG/1
0.5 TABLET ORAL EVERY 8 HOURS PRN
Qty: 20 TABLET | Refills: 0 | Status: SHIPPED | OUTPATIENT
Start: 2020-03-16 | End: 2020-04-28 | Stop reason: SDUPTHER

## 2020-03-16 NOTE — TELEPHONE ENCOUNTER
Rx sent. Okay to keep working as long as she does not have fever or shortness of breath. We can give a note for work if needed.

## 2020-04-28 RX ORDER — LORAZEPAM 0.5 MG/1
0.5 TABLET ORAL EVERY 8 HOURS PRN
Qty: 20 TABLET | Refills: 0 | Status: SHIPPED | OUTPATIENT
Start: 2020-04-28 | End: 2020-07-31 | Stop reason: SDUPTHER

## 2020-07-31 RX ORDER — LORAZEPAM 0.5 MG/1
0.5 TABLET ORAL EVERY 8 HOURS PRN
Qty: 20 TABLET | Refills: 0 | Status: SHIPPED | OUTPATIENT
Start: 2020-07-31 | End: 2021-03-03 | Stop reason: SDUPTHER

## 2020-07-31 NOTE — TELEPHONE ENCOUNTER
Ativan 0.5 mg    [facility] Maintenance   Topic Date Due    Varicella vaccine (1 of 2 - 2-dose childhood series) 04/11/1986    HIV screen  04/11/2000    DTaP/Tdap/Td vaccine (1 - Tdap) 04/11/2004    Cervical cancer screen  04/11/2006    Flu vaccine (1) 09/01/2020    Hepatitis A vaccine  Aged Out    Hepatitis B vaccine  Aged Out    Hib vaccine  Aged Out    Meningococcal (ACWY) vaccine  Aged Out    Pneumococcal 0-64 years Vaccine  Aged Out             (applicable per patient's age: Cancer Screenings, Depression Screening, Fall Risk Screening, Immunizations)    LDL Cholesterol (mg/dL)   Date Value   06/21/2019 73     AST (U/L)   Date Value   06/21/2019 21     ALT (U/L)   Date Value   06/21/2019 19     BUN (mg/dL)   Date Value   06/21/2019 12      (goal A1C is < 7)   (goal LDL is <100) need 30-50% reduction from baseline     BP Readings from Last 3 Encounters:   02/21/20 112/70   06/25/19 120/70   05/22/19 120/70    (goal /80)      All Future Testing planned in [facility]:  Lab Frequency Next Occurrence       Next Visit Date:  No future appointments. There is no problem list on file for this patient.

## 2020-07-31 NOTE — TELEPHONE ENCOUNTER
Last visit:  2/21/2020  Next Visit Date:  No future appointments. Last Med refill:    Medication List:  Prior to Admission medications    Medication Sig Start Date End Date Taking? Authorizing Provider   SUMAtriptan (IMITREX) 50 MG tablet TAKE 1 TABLET BY MOUTH once AS NEEDED FOR MIGRAINE, may repeat in ONE HOUR if needed. Max dose 200 mg in 24 HOURS period 2/21/20   Dale Olea DO   CALCIUM-MAGNESIUM-ZINC PO Take by mouth    Historical Provider, MD   promethazine (PHENERGAN) 12.5 MG tablet Take 1 tablet by mouth every 6 hours as needed for Nausea 6/5/18   Dale Olea DO       Allergies:  Patient has no known allergies.     No results found for: LABA1C          ( goal A1C is < 7)   No results found for: LABMICR  LDL Cholesterol (mg/dL)   Date Value   06/21/2019 73   10/19/2017 66       (goal LDL is <100)   AST (U/L)   Date Value   06/21/2019 21     ALT (U/L)   Date Value   06/21/2019 19     BUN (mg/dL)   Date Value   06/21/2019 12     BP Readings from Last 3 Encounters:   02/21/20 112/70   06/25/19 120/70   05/22/19 120/70          (goal 120/80)

## 2020-09-10 RX ORDER — SUMATRIPTAN 50 MG/1
TABLET, FILM COATED ORAL
Qty: 9 TABLET | Refills: 2 | Status: SHIPPED | OUTPATIENT
Start: 2020-09-10 | End: 2021-03-03 | Stop reason: SDUPTHER

## 2020-09-10 NOTE — TELEPHONE ENCOUNTER
Last visit:  2/21/2020  Next Visit Date:  No future appointments. Last Med refill:    Medication List:  Prior to Admission medications    Medication Sig Start Date End Date Taking? Authorizing Provider   SUMAtriptan (IMITREX) 50 MG tablet TAKE 1 TABLET BY MOUTH once AS NEEDED FOR MIGRAINE, may repeat in ONE HOUR if needed. Max dose 200 mg in 24 HOURS period 2/21/20   Tatyana Dennison DO   CALCIUM-MAGNESIUM-ZINC PO Take by mouth    Historical Provider, MD   promethazine (PHENERGAN) 12.5 MG tablet Take 1 tablet by mouth every 6 hours as needed for Nausea 6/5/18   Tatyana Dennison DO       Allergies:  Patient has no known allergies.     No results found for: LABA1C          ( goal A1C is < 7)   No results found for: LABMICR  LDL Cholesterol (mg/dL)   Date Value   06/21/2019 73   10/19/2017 66       (goal LDL is <100)   AST (U/L)   Date Value   06/21/2019 21     ALT (U/L)   Date Value   06/21/2019 19     BUN (mg/dL)   Date Value   06/21/2019 12     BP Readings from Last 3 Encounters:   02/21/20 112/70   06/25/19 120/70   05/22/19 120/70          (goal 120/80)

## 2020-09-10 NOTE — TELEPHONE ENCOUNTER
Patient currently has a migraine and is out of her Imitrex - could we send a new script for her to [facility] in [facility] Maintenance   Topic Date Due    Varicella vaccine (1 of 2 - 2-dose childhood series) 04/11/1986    HIV screen  04/11/2000    DTaP/Tdap/Td vaccine (1 - Tdap) 04/11/2004    Cervical cancer screen  04/11/2006    Flu vaccine (1) 09/01/2020    Hepatitis A vaccine  Aged Out    Hepatitis B vaccine  Aged Out    Hib vaccine  Aged Out    Meningococcal (ACWY) vaccine  Aged Out    Pneumococcal 0-64 years Vaccine  Aged Out             (applicable per patient's age: Cancer Screenings, Depression Screening, Fall Risk Screening, Immunizations)    LDL Cholesterol (mg/dL)   Date Value   06/21/2019 73     AST (U/L)   Date Value   06/21/2019 21     ALT (U/L)   Date Value   06/21/2019 19     BUN (mg/dL)   Date Value   06/21/2019 12      (goal A1C is < 7)   (goal LDL is <100) need 30-50% reduction from baseline     BP Readings from Last 3 Encounters:   02/21/20 112/70   06/25/19 120/70   05/22/19 120/70    (goal /80)      All Future Testing planned in [facility]:  Lab Frequency Next Occurrence       Next Visit Date:  No future appointments. There is no problem list on file for this patient.

## 2020-09-21 ENCOUNTER — TELEPHONE (OUTPATIENT)
Dept: FAMILY MEDICINE CLINIC | Age: 35
End: 2020-09-21

## 2020-09-21 RX ORDER — TOPIRAMATE 50 MG/1
50 TABLET, FILM COATED ORAL NIGHTLY
Qty: 30 TABLET | Refills: 5 | Status: SHIPPED | OUTPATIENT
Start: 2020-09-21 | End: 2021-03-03 | Stop reason: SDUPTHER

## 2020-10-10 NOTE — PROGRESS NOTES
without rash of the exposed skin. Patient is well-appearing, well-hydrated, nontoxic, comfortable, alert and oriented, pleasant and talkative without apparent distress. They are oriented for age with age appropriate behavior. HENT:   Head: Normocephalic. Neck: Neck supple. Cardiovascular: Normal rate. Pulmonary/Chest: Effort normal and breath sounds normal.   Nursing note and vitals reviewed. /79   Pulse 74   Temp 98.2 °F (36.8 °C) (Oral)   Resp 20   Ht 5' 2\" (1.575 m)   Wt 132 lb (59.9 kg)   LMP 01/06/2018   SpO2 97%   BMI 24.14 kg/m²     Assessment:     1. Laryngitis     2. Upper respiratory tract infection, unspecified type     3. Cough  POCT Influenza A/B   4. Sore throat  POCT rapid strep A       Plan:   Akanksha Falcon was seen today for headache, chills and laryngitis. Diagnoses and all orders for this visit:    Laryngitis    Upper respiratory tract infection, unspecified type    Cough  -     POCT Influenza A/B   NEGATIVE; discussed with patient at time of visit. Sore throat  -     POCT rapid strep A   NEGATIVE; discussed with patient at time of visit. Discussed with patient the natural course of a viral illness and advised to hold antibiotics at this time. Wait and see written prescription given and advised to fill after 48 hours if no improvements and or worsening. Watchful waiting and symptomatic care with rest, oral hydration and acetaminophen, ibuprofen. Reyna Milner appears non-toxic, well hydrated and well appearing. The  patient have been instructed to return or ED if they appear to be getting more seriously ill in any way or concerns. patient have reliable transportation and phone; voiced comfort and understanding with at home care. Cool mist humidifier. Written and verbal instructions provided. Understanding voiced. Akanksha Falcon and or guardian received counseling on nutrition and exercise    Return for ED for worsening symptoms.     No orders of
Melani Galvan(Attending)

## 2020-11-18 ENCOUNTER — TELEPHONE (OUTPATIENT)
Dept: FAMILY MEDICINE CLINIC | Age: 35
End: 2020-11-18

## 2020-11-18 RX ORDER — PREDNISONE 20 MG/1
40 TABLET ORAL DAILY
Qty: 6 TABLET | Refills: 0 | Status: SHIPPED | OUTPATIENT
Start: 2020-11-18 | End: 2020-11-21

## 2020-11-18 NOTE — TELEPHONE ENCOUNTER
Jaquan Gavin went to the [facility] in clinic over the weekend. She said she was negative for strep and negative for COVID. They did prescribe her 500 mg of amoxicillin 3 times daily. She said she started that on Monday. She said her throat is still red, scratchy and hurts to swallow. She is wanting to know if Dr. Natalie Cook could call something else in for her. She is supposed to work 7am - 7 pm tomorrow. Please let Jaquan Gavin know. -[place]    Health Maintenance   Topic Date Due    Varicella vaccine (1 of 2 - 2-dose childhood series) 04/11/1986    HIV screen  04/11/2000    DTaP/Tdap/Td vaccine (1 - Tdap) 04/11/2004    Cervical cancer screen  04/11/2006    Flu vaccine (1) 09/01/2020    Hepatitis A vaccine  Aged Out    Hepatitis B vaccine  Aged Out    Hib vaccine  Aged Out    Meningococcal (ACWY) vaccine  Aged Out    Pneumococcal 0-64 years Vaccine  Aged Out             (applicable per patient's age: Cancer Screenings, Depression Screening, Fall Risk Screening, Immunizations)    LDL Cholesterol (mg/dL)   Date Value   06/21/2019 73     AST (U/L)   Date Value   06/21/2019 21     ALT (U/L)   Date Value   06/21/2019 19     BUN (mg/dL)   Date Value   06/21/2019 12      (goal A1C is < 7)   (goal LDL is <100) need 30-50% reduction from baseline     BP Readings from Last 3 Encounters:   02/21/20 112/70   06/25/19 120/70   05/22/19 120/70    (goal /80)      All Future Testing planned in [facility]:  Lab Frequency Next Occurrence       Next Visit Date:  No future appointments. There is no problem list on file for this patient.

## 2020-12-23 NOTE — TELEPHONE ENCOUNTER
A user error has taken place: encounter opened in error, closed for administrative reasons. Pt does not need refill.

## 2021-02-25 ENCOUNTER — TELEPHONE (OUTPATIENT)
Dept: FAMILY MEDICINE CLINIC | Age: 36
End: 2021-02-25

## 2021-02-25 DIAGNOSIS — F41.9 ANXIETY: Primary | ICD-10-CM

## 2021-03-03 ENCOUNTER — OFFICE VISIT (OUTPATIENT)
Dept: FAMILY MEDICINE CLINIC | Age: 36
End: 2021-03-03
Payer: COMMERCIAL

## 2021-03-03 VITALS
BODY MASS INDEX: 21.85 KG/M2 | WEIGHT: 128 LBS | DIASTOLIC BLOOD PRESSURE: 70 MMHG | HEART RATE: 64 BPM | HEIGHT: 64 IN | SYSTOLIC BLOOD PRESSURE: 126 MMHG

## 2021-03-03 DIAGNOSIS — T14.90XA TRAUMA IN CHILDHOOD: ICD-10-CM

## 2021-03-03 DIAGNOSIS — Z62.810 HISTORY OF SEXUAL ABUSE IN CHILDHOOD: ICD-10-CM

## 2021-03-03 DIAGNOSIS — F41.9 ANXIETY: Primary | ICD-10-CM

## 2021-03-03 PROCEDURE — 99213 OFFICE O/P EST LOW 20 MIN: CPT | Performed by: FAMILY MEDICINE

## 2021-03-03 RX ORDER — PROMETHAZINE HYDROCHLORIDE 12.5 MG/1
12.5 TABLET ORAL EVERY 6 HOURS PRN
Qty: 30 TABLET | Refills: 2 | Status: CANCELLED | OUTPATIENT
Start: 2021-03-03

## 2021-03-03 RX ORDER — SUMATRIPTAN 50 MG/1
TABLET, FILM COATED ORAL
Qty: 9 TABLET | Refills: 2 | Status: SHIPPED | OUTPATIENT
Start: 2021-03-03 | End: 2022-01-18 | Stop reason: SDUPTHER

## 2021-03-03 RX ORDER — BUSPIRONE HYDROCHLORIDE 5 MG/1
5 TABLET ORAL 2 TIMES DAILY
Qty: 60 TABLET | Refills: 1 | Status: SHIPPED | OUTPATIENT
Start: 2021-03-03 | End: 2022-04-20 | Stop reason: ALTCHOICE

## 2021-03-03 RX ORDER — LORAZEPAM 1 MG/1
1 TABLET ORAL EVERY 8 HOURS PRN
Qty: 20 TABLET | Refills: 0 | Status: SHIPPED | OUTPATIENT
Start: 2021-03-03 | End: 2021-04-26 | Stop reason: SDUPTHER

## 2021-03-03 RX ORDER — TOPIRAMATE 50 MG/1
50 TABLET, FILM COATED ORAL NIGHTLY
Qty: 30 TABLET | Refills: 5 | Status: SHIPPED | OUTPATIENT
Start: 2021-03-03 | End: 2021-09-27 | Stop reason: SDUPTHER

## 2021-03-03 SDOH — ECONOMIC STABILITY: FOOD INSECURITY: WITHIN THE PAST 12 MONTHS, YOU WORRIED THAT YOUR FOOD WOULD RUN OUT BEFORE YOU GOT MONEY TO BUY MORE.: NEVER TRUE

## 2021-03-03 SDOH — ECONOMIC STABILITY: INCOME INSECURITY: HOW HARD IS IT FOR YOU TO PAY FOR THE VERY BASICS LIKE FOOD, HOUSING, MEDICAL CARE, AND HEATING?: NOT HARD AT ALL

## 2021-03-03 ASSESSMENT — PATIENT HEALTH QUESTIONNAIRE - PHQ9
SUM OF ALL RESPONSES TO PHQ QUESTIONS 1-9: 0
SUM OF ALL RESPONSES TO PHQ QUESTIONS 1-9: 0
1. LITTLE INTEREST OR PLEASURE IN DOING THINGS: 0
2. FEELING DOWN, DEPRESSED OR HOPELESS: 0
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
## 2021-04-23 DIAGNOSIS — F41.9 ANXIETY: ICD-10-CM

## 2021-04-23 NOTE — TELEPHONE ENCOUNTER
Requesting a refill on Ativan 1 mg. Patient last seen 3/3/21. No future appt found. [facility] [address]  Maintenance   Topic Date Due    Varicella vaccine (1 of 2 - 2-dose childhood series) Never done    HIV screen  Never done    COVID-19 Vaccine (1) Never done    DTaP/Tdap/Td vaccine (1 - Tdap) Never done    Cervical cancer screen  Never done    Flu vaccine (Season Ended) 09/01/2021    Hepatitis C screen  Completed    Hepatitis A vaccine  Aged Out    Hepatitis B vaccine  Aged Out    Hib vaccine  Aged Out    Meningococcal (ACWY) vaccine  Aged Out    Pneumococcal 0-64 years Vaccine  Aged Out             (applicable per patient's age: Cancer Screenings, Depression Screening, Fall Risk Screening, Immunizations)    LDL Cholesterol (mg/dL)   Date Value   06/21/2019 73     AST (U/L)   Date Value   06/21/2019 21     ALT (U/L)   Date Value   06/21/2019 19     BUN (mg/dL)   Date Value   06/21/2019 12      (goal A1C is < 7)   (goal LDL is <100) need 30-50% reduction from baseline     BP Readings from Last 3 Encounters:   03/03/21 126/70   02/21/20 112/70   06/25/19 120/70    (goal /80)      All Future Testing planned in [facility]:  Lab Frequency Next Occurrence       Next Visit Date:  No future appointments. There is no problem list on file for this patient.

## 2021-04-26 RX ORDER — LORAZEPAM 1 MG/1
1 TABLET ORAL EVERY 8 HOURS PRN
Qty: 20 TABLET | Refills: 0 | Status: SHIPPED | OUTPATIENT
Start: 2021-04-26 | End: 2021-07-23 | Stop reason: SDUPTHER

## 2021-07-22 DIAGNOSIS — F41.9 ANXIETY: ICD-10-CM

## 2021-07-22 NOTE — TELEPHONE ENCOUNTER
Ativan 1 mg    [facility] Maintenance   Topic Date Due    Varicella vaccine (1 of 2 - 2-dose childhood series) Never done    COVID-19 Vaccine (1) Never done    HIV screen  Never done    DTaP/Tdap/Td vaccine (1 - Tdap) Never done    Cervical cancer screen  Never done    Flu vaccine (1) 09/01/2021    Hepatitis C screen  Completed    Hepatitis A vaccine  Aged Out    Hepatitis B vaccine  Aged Out    Hib vaccine  Aged Out    Meningococcal (ACWY) vaccine  Aged Out    Pneumococcal 0-64 years Vaccine  Aged Out             (applicable per patient's age: Cancer Screenings, Depression Screening, Fall Risk Screening, Immunizations)    LDL Cholesterol (mg/dL)   Date Value   06/21/2019 73     AST (U/L)   Date Value   06/21/2019 21     ALT (U/L)   Date Value   06/21/2019 19     BUN (mg/dL)   Date Value   06/21/2019 12      (goal A1C is < 7)   (goal LDL is <100) need 30-50% reduction from baseline     BP Readings from Last 3 Encounters:   03/03/21 126/70   02/21/20 112/70   06/25/19 120/70    (goal /80)      All Future Testing planned in [facility]:  Lab Frequency Next Occurrence       Next Visit Date:  No future appointments. There is no problem list on file for this patient.

## 2021-07-22 NOTE — TELEPHONE ENCOUNTER
Last visit:  3/3/2021  Next Visit Date:  No future appointments. Medication List:  Prior to Admission medications    Medication Sig Start Date End Date Taking? Authorizing Provider   SUMAtriptan (IMITREX) 50 MG tablet TAKE 1 TABLET BY MOUTH once AS NEEDED FOR MIGRAINE, may repeat in ONE HOUR if needed.  Max dose 200 mg in 24 HOURS period 3/3/21   Jyoti Alfonso DO   topiramate (TOPAMAX) 50 MG tablet Take 1 tablet by mouth nightly 3/3/21   Jyoti Alfonso DO   busPIRone (BUSPAR) 5 MG tablet Take 1 tablet by mouth 2 times daily 3/3/21   Jyoti Alfonso DO   CALCIUM-MAGNESIUM-ZINC PO Take by mouth    Historical Provider, MD   promethazine (PHENERGAN) 12.5 MG tablet Take 1 tablet by mouth every 6 hours as needed for Nausea 6/5/18   Jyoti Alfonso DO

## 2021-07-23 RX ORDER — LORAZEPAM 1 MG/1
1 TABLET ORAL EVERY 8 HOURS PRN
Qty: 20 TABLET | Refills: 0 | Status: SHIPPED | OUTPATIENT
Start: 2021-07-23 | End: 2022-07-05 | Stop reason: SDUPTHER

## 2021-09-27 RX ORDER — TOPIRAMATE 50 MG/1
50 TABLET, FILM COATED ORAL NIGHTLY
Qty: 30 TABLET | Refills: 5 | Status: SHIPPED | OUTPATIENT
Start: 2021-09-27 | End: 2022-04-01 | Stop reason: SDUPTHER

## 2021-09-27 NOTE — TELEPHONE ENCOUNTER
Patient asking for a new script for Topamax - patient uses [facility] Maintenance   Topic Date Due    Varicella vaccine (1 of 2 - 2-dose childhood series) Never done    COVID-19 Vaccine (1) Never done    HIV screen  Never done    DTaP/Tdap/Td vaccine (1 - Tdap) Never done    Cervical cancer screen  Never done    Flu vaccine (1) 09/01/2021    Hepatitis C screen  Completed    Hepatitis A vaccine  Aged Out    Hepatitis B vaccine  Aged Out    Hib vaccine  Aged Out    Meningococcal (ACWY) vaccine  Aged Out    Pneumococcal 0-64 years Vaccine  Aged Out             (applicable per patient's age: Cancer Screenings, Depression Screening, Fall Risk Screening, Immunizations)    LDL Cholesterol (mg/dL)   Date Value   06/21/2019 73     AST (U/L)   Date Value   06/21/2019 21     ALT (U/L)   Date Value   06/21/2019 19     BUN (mg/dL)   Date Value   06/21/2019 12      (goal A1C is < 7)   (goal LDL is <100) need 30-50% reduction from baseline     BP Readings from Last 3 Encounters:   03/03/21 126/70   02/21/20 112/70   06/25/19 120/70    (goal /80)      All Future Testing planned in [facility]:  Lab Frequency Next Occurrence       Next Visit Date:  No future appointments. There is no problem list on file for this patient.

## 2021-09-27 NOTE — TELEPHONE ENCOUNTER
Last visit:  3/3/2021  Next Visit Date:  No future appointments. Medication List:  Prior to Admission medications    Medication Sig Start Date End Date Taking? Authorizing Provider   SUMAtriptan (IMITREX) 50 MG tablet TAKE 1 TABLET BY MOUTH once AS NEEDED FOR MIGRAINE, may repeat in ONE HOUR if needed.  Max dose 200 mg in 24 HOURS period 3/3/21   Darletta Meigs, DO   topiramate (TOPAMAX) 50 MG tablet Take 1 tablet by mouth nightly 3/3/21   Darletta Meigs, DO   busPIRone (BUSPAR) 5 MG tablet Take 1 tablet by mouth 2 times daily 3/3/21   Darletta Meigs, DO   CALCIUM-MAGNESIUM-ZINC PO Take by mouth    Historical Provider, MD   promethazine (PHENERGAN) 12.5 MG tablet Take 1 tablet by mouth every 6 hours as needed for Nausea 6/5/18   Darletta Meigs, DO

## 2021-11-15 ENCOUNTER — OFFICE VISIT (OUTPATIENT)
Dept: FAMILY MEDICINE CLINIC | Age: 36
End: 2021-11-15
Payer: COMMERCIAL

## 2021-11-15 VITALS
OXYGEN SATURATION: 99 % | HEART RATE: 64 BPM | HEIGHT: 64 IN | WEIGHT: 126 LBS | RESPIRATION RATE: 20 BRPM | SYSTOLIC BLOOD PRESSURE: 102 MMHG | DIASTOLIC BLOOD PRESSURE: 70 MMHG | BODY MASS INDEX: 21.51 KG/M2

## 2021-11-15 DIAGNOSIS — R05.9 COUGH IN ADULT: Primary | ICD-10-CM

## 2021-11-15 DIAGNOSIS — J02.9 SORE THROAT: ICD-10-CM

## 2021-11-15 DIAGNOSIS — B96.89 ACUTE BACTERIAL BRONCHITIS: ICD-10-CM

## 2021-11-15 DIAGNOSIS — R09.81 NASAL CONGESTION: ICD-10-CM

## 2021-11-15 DIAGNOSIS — J20.8 ACUTE BACTERIAL BRONCHITIS: ICD-10-CM

## 2021-11-15 DIAGNOSIS — J34.89 RHINORRHEA: ICD-10-CM

## 2021-11-15 PROCEDURE — 99213 OFFICE O/P EST LOW 20 MIN: CPT | Performed by: STUDENT IN AN ORGANIZED HEALTH CARE EDUCATION/TRAINING PROGRAM

## 2021-11-15 RX ORDER — BENZONATATE 100 MG/1
100-200 CAPSULE ORAL 3 TIMES DAILY PRN
Qty: 60 CAPSULE | Refills: 0 | Status: SHIPPED | OUTPATIENT
Start: 2021-11-15 | End: 2021-11-22

## 2021-11-15 RX ORDER — AZITHROMYCIN 250 MG/1
250 TABLET, FILM COATED ORAL SEE ADMIN INSTRUCTIONS
Qty: 6 TABLET | Refills: 0 | Status: SHIPPED | OUTPATIENT
Start: 2021-11-15 | End: 2021-11-20

## 2021-11-15 ASSESSMENT — ENCOUNTER SYMPTOMS
BACK PAIN: 0
SORE THROAT: 1
VOMITING: 0
WHEEZING: 0
RHINORRHEA: 1
SINUS PAIN: 0
COUGH: 1
SHORTNESS OF BREATH: 1
ABDOMINAL PAIN: 0
DIARRHEA: 0
NAUSEA: 0

## 2021-11-15 NOTE — PROGRESS NOTES
HPI Notes    Name: Germania Anderson  : 1985         Chief Complaint:     Chief Complaint   Patient presents with    Cough       History of Present Illness:      HPI    This is a 14-year-old young woman presenting for evaluation of cough, shortness of air, sore throat, nasal congestion, rhinorrhea. The symptoms of been ongoing since about ten days ago. She did complete a home COVID-19 test 6 days ago, which was reportedly negative. She had been using robitussin cold/flu mixutre, which she reported was beneficial at alleviating her symptoms. She has not been checking her temperature daily, but reports she has had chills and may have had a fever. She does have sick contacts at home, her son and the rest of her family. Her son is not improving, I am seeing him today as well. Past Medical History:     Past Medical History:   Diagnosis Date    Heart murmur     at birth   Macey Ward       Reviewed all health maintenance requirements and ordered appropriate tests  Health Maintenance Due   Topic Date Due    Varicella vaccine (1 of 2 - 2-dose childhood series) Never done    HIV screen  Never done    DTaP/Tdap/Td vaccine (1 - Tdap) Never done    Cervical cancer screen  Never done    Flu vaccine (1) 2021       Past Surgical History:     Past Surgical History:   Procedure Laterality Date    APPENDECTOMY      OVARY REMOVAL Left     TYMPANOSTOMY TUBE PLACEMENT          Medications:       Prior to Admission medications    Medication Sig Start Date End Date Taking? Authorizing Provider   topiramate (TOPAMAX) 50 MG tablet Take 1 tablet by mouth nightly 21   Raven Knight, DO   SUMAtriptan (IMITREX) 50 MG tablet TAKE 1 TABLET BY MOUTH once AS NEEDED FOR MIGRAINE, may repeat in ONE HOUR if needed.  Max dose 200 mg in 24 HOURS period 3/3/21   Raven Calie, DO   busPIRone (BUSPAR) 5 MG tablet Take 1 tablet by mouth 2 times daily 3/3/21   Raven Calie, DO   CALCIUM-MAGNESIUM-ZINC PO Take by mouth    Historical Provider, MD   promethazine (PHENERGAN) 12.5 MG tablet Take 1 tablet by mouth every 6 hours as needed for Nausea 6/5/18   Alisha Falcon DO        Allergies:       Patient has no known allergies. Social History:     Tobacco:    reports that she has never smoked. She has never used smokeless tobacco.  Alcohol:      reports current alcohol use. Drug Use:  reports no history of drug use. Family History:     Family History   Problem Relation Age of Onset    Hypertension Mother        Review of Systems:         Review of Systems   Constitutional: Positive for chills and fatigue. Negative for fever. HENT: Positive for congestion, postnasal drip, rhinorrhea and sore throat. Negative for sinus pain and sneezing. Respiratory: Positive for cough and shortness of breath. Negative for wheezing. Cardiovascular: Negative for chest pain. Gastrointestinal: Negative for abdominal pain, diarrhea, nausea and vomiting. Musculoskeletal: Negative for back pain. Skin: Negative for rash. Neurological: Negative for headaches. Psychiatric/Behavioral: Negative for sleep disturbance. Physical Exam:     Vitals: There were no vitals taken for this visit. Physical Exam  Vitals and nursing note reviewed. Constitutional:       General: She is not in acute distress. Appearance: Normal appearance. She is normal weight. HENT:      Right Ear: Tympanic membrane, ear canal and external ear normal. There is no impacted cerumen. Left Ear: Tympanic membrane, ear canal and external ear normal. There is no impacted cerumen. Nose: Nose normal. No congestion or rhinorrhea. Mouth/Throat:      Mouth: Mucous membranes are dry. Pharynx: No oropharyngeal exudate or posterior oropharyngeal erythema. Eyes:      Conjunctiva/sclera: Conjunctivae normal.   Cardiovascular:      Rate and Rhythm: Normal rate and regular rhythm. Pulses: Normal pulses.       Heart sounds: Normal heart sounds. No murmur heard. Pulmonary:      Effort: Pulmonary effort is normal.      Breath sounds: Normal breath sounds. No wheezing or rhonchi. Comments: Moist productive cough  Musculoskeletal:      Cervical back: Normal range of motion. Tenderness present. Lymphadenopathy:      Cervical: Cervical adenopathy present. Skin:     General: Skin is warm and dry. Capillary Refill: Capillary refill takes less than 2 seconds. Comments: Clammy skin   Neurological:      General: No focal deficit present. Mental Status: She is alert and oriented to person, place, and time. Mental status is at baseline. Cranial Nerves: No cranial nerve deficit. Psychiatric:         Mood and Affect: Mood normal.         Behavior: Behavior normal.         Thought Content: Thought content normal.                 Data:     Lab Results   Component Value Date     06/21/2019    K 4.3 06/21/2019     06/21/2019    CO2 23 06/21/2019    BUN 12 06/21/2019    CREATININE 0.77 06/21/2019    GLUCOSE 92 06/21/2019    GLUCOSE 74 12/31/2011    PROT 7.2 06/21/2019    LABALBU 4.6 06/21/2019    BILITOT 0.47 06/21/2019    ALKPHOS 40 06/21/2019    AST 21 06/21/2019    ALT 19 06/21/2019     Lab Results   Component Value Date    WBC 6.1 05/20/2019    RBC 3.96 05/20/2019    RBC 4.26 12/31/2011    HGB 12.5 05/20/2019    HCT 36.5 05/20/2019    MCV 92.4 05/20/2019    MCH 31.5 05/20/2019    MCHC 34.1 05/20/2019    RDW 13.5 05/20/2019     05/20/2019     12/31/2011    MPV NOT REPORTED 05/20/2019     Lab Results   Component Value Date    TSH 3.14 05/20/2019     Lab Results   Component Value Date    CHOL 164 06/21/2019    HDL 84 06/21/2019          Assessment & Plan        Diagnosis Orders   1. Cough in adult     2. Rhinorrhea     3. Sore throat     4. Nasal congestion         1.   I believe that she has a superinfection of bacterial bronchitis on top of the likely original viral upper respiratory infection. She has a relatively long duration for this to be viral in nature. I would recommend azithromycin for 5 days, benzonatate 3 times daily as needed for relief of cough. I would also recommend that she take off work for 24 hours, returning to work on 11/16/2021. Follow-up as needed if not improved. Completed Refills   Requested Prescriptions      No prescriptions requested or ordered in this encounter     No follow-ups on file. No orders of the defined types were placed in this encounter. No orders of the defined types were placed in this encounter. There are no Patient Instructions on file for this visit. Electronically signed by Kayleigh Nicole DO on 11/15/2021 at 1:14 PM           Completed Refills   Requested Prescriptions      No prescriptions requested or ordered in this encounter         Fransico Lyle received counseling on the following healthy behaviors: nutrition, exercise and medication adherence  Reviewed prior labs and health maintenance. Continue current medications, diet and exercise. Discussed use, benefit, and side effects of prescribed medications. Barriers to medication compliance addressed. Patient given educational materials - see patient instructions. All patient questions answered. Patient voiced understanding.

## 2021-11-15 NOTE — PATIENT INSTRUCTIONS
SURVEY:    You may be receiving a survey from [employer] regarding your visit today. Please complete the survey to enable us to provide the highest quality of care to you and your family. If you cannot score us a very good on any question, please call the office to discuss how we could of made your experience a very good one. Thank you.       Clinical Care Team:     Dr. Arash Julian, [facility]      ClericalTeam:     [id number] [facility]

## 2021-11-23 ENCOUNTER — TELEPHONE (OUTPATIENT)
Dept: FAMILY MEDICINE CLINIC | Age: 36
End: 2021-11-23

## 2021-11-23 DIAGNOSIS — R05.9 COUGH IN ADULT: Primary | ICD-10-CM

## 2021-11-23 RX ORDER — ALBUTEROL SULFATE 90 UG/1
2 AEROSOL, METERED RESPIRATORY (INHALATION) 4 TIMES DAILY PRN
Qty: 18 G | Refills: 0 | Status: CANCELLED | OUTPATIENT
Start: 2021-11-23

## 2021-11-23 RX ORDER — ALBUTEROL SULFATE 90 UG/1
2 AEROSOL, METERED RESPIRATORY (INHALATION) 4 TIMES DAILY PRN
Qty: 18 G | Refills: 5 | Status: SHIPPED | OUTPATIENT
Start: 2021-11-23

## 2021-11-23 NOTE — TELEPHONE ENCOUNTER
Please call the patient and inquire for some more details of her symptoms. That she coughing up any blood? Does she have a fever? She has no fever and productive cough is her only symptom, I would recommend that she gargle with salt water every evening, and continue to use benzonatate, she may also  some guaifenesin to take with plenty of water to help her cough more mucus out.

## 2021-11-23 NOTE — TELEPHONE ENCOUNTER
Patient states that she was to call back if she wasn't better - she states that she is better but still cough up green phlegm     Health Maintenance   Topic Date Due    Varicella vaccine (1 of 2 - 2-dose childhood series) Never done    HIV screen  Never done    DTaP/Tdap/Td vaccine (1 - Tdap) Never done    Cervical cancer screen  Never done    Flu vaccine (1) 09/01/2021    COVID-19 Vaccine  Completed    Hepatitis C screen  Completed    Hepatitis A vaccine  Aged Out    Hepatitis B vaccine  Aged Out    Hib vaccine  Aged Out    Meningococcal (ACWY) vaccine  Aged Out    Pneumococcal 0-64 years Vaccine  Aged Out             (applicable per patient's age: Cancer Screenings, Depression Screening, Fall Risk Screening, Immunizations)    LDL Cholesterol (mg/dL)   Date Value   06/21/2019 73     AST (U/L)   Date Value   06/21/2019 21     ALT (U/L)   Date Value   06/21/2019 19     BUN (mg/dL)   Date Value   06/21/2019 12      (goal A1C is < 7)   (goal LDL is <100) need 30-50% reduction from baseline     BP Readings from Last 3 Encounters:   11/15/21 102/70   03/03/21 126/70   02/21/20 112/70    (goal /80)      All Future Testing planned in [facility]:  Lab Frequency Next Occurrence       Next Visit Date:  No future appointments. There is no problem list on file for this patient.

## 2022-01-18 RX ORDER — SUMATRIPTAN 50 MG/1
TABLET, FILM COATED ORAL
Qty: 9 TABLET | Refills: 2 | Status: SHIPPED | OUTPATIENT
Start: 2022-01-18 | End: 2022-10-21 | Stop reason: ALTCHOICE

## 2022-01-18 NOTE — TELEPHONE ENCOUNTER
Last visit:  11/15/2021  Next Visit Date:  No future appointments. Medication List:  Prior to Admission medications    Medication Sig Start Date End Date Taking? Authorizing Provider   albuterol sulfate HFA (VENTOLIN HFA) 108 (90 Base) MCG/ACT inhaler Inhale 2 puffs into the lungs 4 times daily as needed for Wheezing 11/23/21   Arabella Raines,    topiramate (TOPAMAX) 50 MG tablet Take 1 tablet by mouth nightly 9/27/21   Trena Vallejo,    SUMAtriptan (IMITREX) 50 MG tablet TAKE 1 TABLET BY MOUTH once AS NEEDED FOR MIGRAINE, may repeat in ONE HOUR if needed.  Max dose 200 mg in 24 HOURS period 3/3/21   Trena Vallejo DO   busPIRone (BUSPAR) 5 MG tablet Take 1 tablet by mouth 2 times daily 3/3/21   Trena Vallejo, DO   CALCIUM-MAGNESIUM-ZINC PO Take by mouth    Historical Provider, MD   promethazine (PHENERGAN) 12.5 MG tablet Take 1 tablet by mouth every 6 hours as needed for Nausea 6/5/18   Trena Vallejo, DO

## 2022-01-18 NOTE — TELEPHONE ENCOUNTER
imitrex 50 mg    [facility] Maintenance   Topic Date Due    Varicella vaccine (1 of 2 - 2-dose childhood series) Never done    HIV screen  Never done    DTaP/Tdap/Td vaccine (1 - Tdap) Never done    Cervical cancer screen  Never done    Flu vaccine (1) 09/01/2021    Depression Screen  03/03/2022    COVID-19 Vaccine (3 - Booster for Pfizer series) 03/29/2022    Hepatitis C screen  Completed    Hepatitis A vaccine  Aged Out    Hepatitis B vaccine  Aged Out    Hib vaccine  Aged Out    Meningococcal (ACWY) vaccine  Aged Out    Pneumococcal 0-64 years Vaccine  Aged Out             (applicable per patient's age: Cancer Screenings, Depression Screening, Fall Risk Screening, Immunizations)    LDL Cholesterol (mg/dL)   Date Value   06/21/2019 73     AST (U/L)   Date Value   06/21/2019 21     ALT (U/L)   Date Value   06/21/2019 19     BUN (mg/dL)   Date Value   06/21/2019 12      (goal A1C is < 7)   (goal LDL is <100) need 30-50% reduction from baseline     BP Readings from Last 3 Encounters:   11/15/21 102/70   03/03/21 126/70   02/21/20 112/70    (goal /80)      All Future Testing planned in [facility]:  Lab Frequency Next Occurrence       Next Visit Date:  No future appointments. There is no problem list on file for this patient.

## 2022-01-21 ENCOUNTER — TELEPHONE (OUTPATIENT)
Dept: FAMILY MEDICINE CLINIC | Age: 37
End: 2022-01-21

## 2022-01-21 RX ORDER — AMOXICILLIN 500 MG/1
500 CAPSULE ORAL 2 TIMES DAILY
Qty: 20 CAPSULE | Refills: 0 | Status: SHIPPED | OUTPATIENT
Start: 2022-01-21 | End: 2022-04-20 | Stop reason: ALTCHOICE
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
## 2022-04-01 RX ORDER — TOPIRAMATE 50 MG/1
50 TABLET, FILM COATED ORAL NIGHTLY
Qty: 30 TABLET | Refills: 5 | Status: SHIPPED | OUTPATIENT
Start: 2022-04-01 | End: 2022-06-09

## 2022-04-01 NOTE — TELEPHONE ENCOUNTER
Last OV: 11/15/2021 cough 03/03/21 anxiety  Last RX:    Next scheduled apt: Visit date not found        Pt requesting a refill

## 2022-04-01 NOTE — TELEPHONE ENCOUNTER
----- Message from Edenilson Kelly sent at 4/1/2022  2:31 PM EDT -----  Subject: Refill Request    QUESTIONS  Name of Medication? topiramate (TOPAMAX) 50 MG tablet  Patient-reported dosage and instructions? Take 1 tablet by mouth nightly  How many days do you have left? 0  Preferred Pharmacy? [id number]  Pharmacy phone number (if available)? [phone]  ---------------------------------------------------------------------------  --------------  CALL BACK INFO  What is the best way for the office to contact you? OK to leave message on   voicemail  Preferred Call Back Phone Number? [phone]  ---------------------------------------------------------------------------  --------------  SCRIPT ANSWERS  Relationship to Patient?  Self

## 2022-04-20 ENCOUNTER — OFFICE VISIT (OUTPATIENT)
Dept: FAMILY MEDICINE CLINIC | Age: 37
End: 2022-04-20
Payer: COMMERCIAL

## 2022-04-20 VITALS
OXYGEN SATURATION: 99 % | WEIGHT: 135 LBS | DIASTOLIC BLOOD PRESSURE: 70 MMHG | BODY MASS INDEX: 23.05 KG/M2 | HEIGHT: 64 IN | HEART RATE: 53 BPM | SYSTOLIC BLOOD PRESSURE: 110 MMHG

## 2022-04-20 DIAGNOSIS — F41.9 ANXIETY: ICD-10-CM

## 2022-04-20 DIAGNOSIS — R07.89 ATYPICAL CHEST PAIN: Primary | ICD-10-CM

## 2022-04-20 PROCEDURE — 99214 OFFICE O/P EST MOD 30 MIN: CPT | Performed by: FAMILY MEDICINE

## 2022-04-20 RX ORDER — LORAZEPAM 1 MG/1
1 TABLET ORAL
COMMUNITY
Start: 2021-04-26

## 2022-04-20 SDOH — ECONOMIC STABILITY: FOOD INSECURITY: WITHIN THE PAST 12 MONTHS, THE FOOD YOU BOUGHT JUST DIDN'T LAST AND YOU DIDN'T HAVE MONEY TO GET MORE.: NEVER TRUE

## 2022-04-20 SDOH — ECONOMIC STABILITY: FOOD INSECURITY: WITHIN THE PAST 12 MONTHS, YOU WORRIED THAT YOUR FOOD WOULD RUN OUT BEFORE YOU GOT MONEY TO BUY MORE.: NEVER TRUE

## 2022-04-20 ASSESSMENT — PATIENT HEALTH QUESTIONNAIRE - PHQ9
2. FEELING DOWN, DEPRESSED OR HOPELESS: 0
SUM OF ALL RESPONSES TO PHQ QUESTIONS 1-9: 0
1. LITTLE INTEREST OR PLEASURE IN DOING THINGS: 0
SUM OF ALL RESPONSES TO PHQ QUESTIONS 1-9: 0
SUM OF ALL RESPONSES TO PHQ9 QUESTIONS 1 & 2: 0

## 2022-04-20 ASSESSMENT — SOCIAL DETERMINANTS OF HEALTH (SDOH): HOW HARD IS IT FOR YOU TO PAY FOR THE VERY BASICS LIKE FOOD, HOUSING, MEDICAL CARE, AND HEATING?: NOT HARD AT ALL

## 2022-04-20 NOTE — PROGRESS NOTES
Name: Dain Huerta  : 1985         Chief Complaint:     Chief Complaint   Patient presents with    Chest Pain     extreme heaviness in chest and neck. History of Present Illness:      Dain Huerta is a 40 y.o.  female who presents with Chest Pain (extreme heaviness in chest and neck. )      HPI     Pt c/o recent episode of severe chest pain. Occurred 4/3. That day she had been taking care of stepsister and she had 10 seizures, pt was having to make med decisions, wait for on-call dr to call back. End of day had severe pressure in chest, felt like chest was cracking open, spread across whole chest and anterior neck. Lasted for a while. Next day had horrible migraine, had to take both imitrex doses, and BP was very high, 149/90s. Has felt ok since then, no further chest pain, able to do normal activities, no SOB or unusual fatigue. 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 Experiences a lot of manipulation and guilt from mom, is asked to intervene in relationship between mom & stepdad. Finds self having a lot of flashbacks from childhood (for example, consoling mom while she cried with head in pt's lap when pt was only 4-5 yrs old) since she took this position. 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 Had been doing counseling but stopped because of time commitments and counselor was just starting to talk about herself. Had been to 4 appointments and it seemed like it maybe it would have started to help. Had taken maintenance med as a teen but felt very flat which made her depressed, even had a suicide attempt. Uses ativan prn which does help, dosing limited a little d/t sleepiness.      Past Medical History:     Past Medical History:   Diagnosis Date    Heart murmur     at birth   [employer]         Past Surgical History:     Past Surgical History:   Procedure Laterality Date    APPENDECTOMY      OVARY REMOVAL Left     TYMPANOSTOMY TUBE PLACEMENT          Medications:       Prior to Admission medications    Medication Sig Start Date End Date Taking? Authorizing Provider   LORazepam (ATIVAN) 1 MG tablet Take 1 mg by mouth. 4/26/21  Yes Historical Provider, MD   topiramate (TOPAMAX) 50 MG tablet Take 1 tablet by mouth nightly 4/1/22  Yes Chloe Polanco DO   SUMAtriptan (IMITREX) 50 MG tablet TAKE 1 TABLET BY MOUTH once AS NEEDED FOR MIGRAINE, may repeat in ONE HOUR if needed. Max dose 200 mg in 24 HOURS period 1/18/22  Yes Chloe Polanco DO   albuterol sulfate HFA (VENTOLIN HFA) 108 (90 Base) MCG/ACT inhaler Inhale 2 puffs into the lungs 4 times daily as needed for Wheezing 11/23/21  Yes Lesley Cheung,    CALCIUM-MAGNESIUM-ZINC PO Take by mouth   Yes Historical Provider, MD   promethazine (PHENERGAN) 12.5 MG tablet Take 1 tablet by mouth every 6 hours as needed for Nausea 6/5/18  Yes Chloe Polanco DO        Allergies:       Patient has no known allergies. Social History:     Tobacco:    reports that she has never smoked. She has never used smokeless tobacco.  Alcohol:      reports current alcohol use. Drug Use:  reports no history of drug use. Family History:     Family History   Problem Relation Age of Onset    Hypertension Mother        Review of Systems:     Positive and Negative as described in HPI    Review of Systems    Physical Exam:     Vitals:  /70   Pulse 53   Ht 5' 4\" (1.626 m)   Wt 135 lb (61.2 kg)   SpO2 99%   BMI 23.17 kg/m²   Physical Exam  Constitutional:       Appearance: Normal appearance. She is not ill-appearing. Psychiatric:      Comments: Good eye contact, conversant, tearful for much of visit and does appear to develop some acute anxiety discussing family situation, rapid breathing, lower lip shaking, sobbing.          Data:     Lab Results   Component Value Date     06/21/2019    K 4.3 06/21/2019     06/21/2019    CO2 23 06/21/2019    BUN 12 06/21/2019    CREATININE 0.77 06/21/2019    GLUCOSE 92 06/21/2019    GLUCOSE 74 12/31/2011    PROT 7.2 06/21/2019    LABALBU 4.6 06/21/2019    BILITOT 0.47 06/21/2019    ALKPHOS 40 06/21/2019    AST 21 06/21/2019    ALT 19 06/21/2019     Lab Results   Component Value Date    WBC 6.1 05/20/2019    RBC 3.96 05/20/2019    RBC 4.26 12/31/2011    HGB 12.5 05/20/2019    HCT 36.5 05/20/2019    MCV 92.4 05/20/2019    MCH 31.5 05/20/2019    MCHC 34.1 05/20/2019    RDW 13.5 05/20/2019     05/20/2019     12/31/2011    MPV NOT REPORTED 05/20/2019     Lab Results   Component Value Date    TSH 3.14 05/20/2019     Lab Results   Component Value Date    CHOL 164 06/21/2019    HDL 84 06/21/2019 7/2/19 holter    She wore a Holter monitor for 48 hours on 07/02/2019. We had 22  transmissions. Each transmission demonstrated sinus rhythm. Her heart  rate was between 53 and 87. She was asymptomatic. Did have mild sinus  bradycardia but not in the range that would warrant any intervention. It is an overall unremarkable Holter monitor with no significant  arrhythmias that needs treatment at this time. 7/2/19 ekg stress treadmill    REASON FOR TEST:  Shortness of breath and chest pain.     She exercised 7 minutes and 30 seconds on accelerated Jono protocol. Her peak heart rate was 171, which was 91% of maximum predicted heart  rate. She achieved 12.6 METs, which is an excellent exercise capacity.     She had no chest pain, no ST depression. It was overall normal cardiac  stress test with no reproduction of chest pain and no arrhythmias or ST  depression with activity. 5/30/19 echo    Summary  Left ventricle is normal in size. Global left ventricular systolic function is normal with an estimated  ejection fraction of 60 % . Left atrium is normal in size.   Right atrium is normal in size.  Normal right ventricular size and function. Normal aortic valve structure and function without stenosis or  regurgitation. Mildly thickened mitral valve leaflets. Slight MVP, although not enough to make definitive diagnosis  Trivial MR  MV is slightly thicker than what I would expect in 30yo female. If any indication of SBE, would get STUART     In summary, she has normal LV size and function with normal right sided  chambers  MV mildly thickened, and if any indication of SBE would obtain STUART, as I  cannot absolutely rule out vegetation. Assessment & Plan:        Diagnosis Orders   1. Atypical chest pain     2. Anxiety       Chest pain r/t acute stress, extremely difficult day emotionally 4/3. No subsequent symptoms of heart damage. Very young age (plus female, no htn, dm, smoking, or significant family history) and had neg testing as above. Reassured. Discussed anxiety, trauma history, family situation with her mother. Advised restarting counseling, consider trauma-specific, advised asking counselor to specifically give her techniques to use to help anxiety rather than just conversing. STRONGLY advised that she get out of work situation at [employer] [employer].         Electronically signed by Nomi Aguirre DO on 4/22/2022 at 4:15 PM   [address]  Dept: [phone]

## 2022-06-09 ENCOUNTER — OFFICE VISIT (OUTPATIENT)
Dept: FAMILY MEDICINE CLINIC | Age: 37
End: 2022-06-09
Payer: COMMERCIAL

## 2022-06-09 VITALS
BODY MASS INDEX: 22.88 KG/M2 | WEIGHT: 134 LBS | OXYGEN SATURATION: 100 % | SYSTOLIC BLOOD PRESSURE: 100 MMHG | DIASTOLIC BLOOD PRESSURE: 62 MMHG | HEIGHT: 64 IN | HEART RATE: 53 BPM

## 2022-06-09 DIAGNOSIS — G43.109 MIGRAINE WITH AURA AND WITHOUT STATUS MIGRAINOSUS, NOT INTRACTABLE: Primary | ICD-10-CM

## 2022-06-09 PROCEDURE — 99213 OFFICE O/P EST LOW 20 MIN: CPT | Performed by: FAMILY MEDICINE

## 2022-06-09 RX ORDER — ZONISAMIDE 100 MG/1
100 CAPSULE ORAL NIGHTLY
Qty: 30 CAPSULE | Refills: 3 | Status: SHIPPED | OUTPATIENT
Start: 2022-06-09 | End: 2022-06-30 | Stop reason: SDUPTHER

## 2022-06-09 RX ORDER — UBROGEPANT 100 MG/1
100 TABLET ORAL DAILY
Qty: 2 TABLET | Refills: 0 | COMMUNITY
Start: 2022-06-09 | End: 2022-06-09

## 2022-06-09 RX ORDER — SUMATRIPTAN 20 MG/1
1 SPRAY NASAL PRN
Qty: 1 EACH | Refills: 3 | Status: SHIPPED | OUTPATIENT
Start: 2022-06-09 | End: 2022-10-21 | Stop reason: ALTCHOICE

## 2022-06-09 NOTE — PATIENT INSTRUCTIONS
SURVEY:    You may be receiving a survey from [email or web address] regarding your visit today. You may get this in the mail, through your MyChart or in your email. Please complete the survey to enable us to provide the highest quality of care to you and your family. If you cannot score us as very good ( 5 Stars) on any question, please feel free to call the office to discuss how we could have made your experience exceptional.     Thank you.     Clinical Care Team:  Dr. Berkley Del Cid, DO Rebel Gramajo, [address] [place] [address] Team:  [address] [place] [address]

## 2022-06-09 NOTE — PROGRESS NOTES
Name: Geovanna Perez  : 1985         Chief Complaint:     Chief Complaint   Patient presents with    Migraine     imitrex not helping, migraines every 2-3 days. History of Present Illness:      Geovanna Perez is a 40 y.o.  female who presents with Migraine (imitrex not helping, migraines every 2-3 days. )      HPI    Pt c/o migraines increasing in frequency over past few mos. Not typically getting aura before headache anymore but does get visual disturbance during headache a lot of times. Takes imitrex right away and then the second one pretty quick afterwards but doesn't find that it helps at all. Migraines can be set off by weather pressure changes and also sometimes by neck being off. Does yoga, walking, and bike riding. Sees chiro and has been doing lumbar decompression. No chiro cervical tx as it had set off worst migraine ever. Sister uses [facility] [place] with success. Pt tried sister's imitrex nasal spray which worked better than pills. Mood greatly improved, had some breakthroughs in family relationships. Medical History:     Patient Active Problem List   Diagnosis    Migraine with aura and without status migrainosus, not intractable       Medications:       Prior to Admission medications    Medication Sig Start Date End Date Taking? Authorizing Provider   Ubrogepant (UBRELVY) 100 MG TABS Take 100 mg by mouth daily Samples, [id number]  LOT [id number]  EX 22  Yes Berkley [place], DO   zonisamide (ZONEGRAN) 100 MG capsule Take 1 capsule by mouth nightly 22  Yes Berkley Del Cid, DO   SUMAtriptan (IMITREX) 20 MG/ACT nasal spray 1 spray by Nasal route as needed for Migraine 22 Yes Berkley Del Cid, DO   LORazepam (ATIVAN) 1 MG tablet Take 1 mg by mouth. 21  Yes Historical Provider, MD   SUMAtriptan (IMITREX) 50 MG tablet TAKE 1 TABLET BY MOUTH once AS NEEDED FOR MIGRAINE, may repeat in ONE HOUR if needed.  Max dose 200 mg in 24 HOURS period 1/18/22  Yes Dalton Patel DO   albuterol sulfate HFA (VENTOLIN HFA) 108 (90 Base) MCG/ACT inhaler Inhale 2 puffs into the lungs 4 times daily as needed for Wheezing 11/23/21  Yes Justina Holguin DO   CALCIUM-MAGNESIUM-ZINC PO Take by mouth   Yes Historical Provider, MD   promethazine (PHENERGAN) 12.5 MG tablet Take 1 tablet by mouth every 6 hours as needed for Nausea 6/5/18  Yes Dalton Patel DO        Allergies:       Patient has no known allergies. Physical Exam:     Vitals:  /62   Pulse 53   Ht 5' 4\" (1.626 m)   Wt 134 lb (60.8 kg)   SpO2 100%   BMI 23.00 kg/m²   Physical Exam  Vitals and nursing note reviewed. Constitutional:       General: She is not in acute distress. Appearance: She is well-developed. HENT:      Head: Normocephalic and atraumatic. Right Ear: Tympanic membrane and ear canal normal.      Left Ear: Tympanic membrane and ear canal normal.      Nose: Nose normal.   Eyes:      Conjunctiva/sclera: Conjunctivae normal.   Cardiovascular:      Rate and Rhythm: Normal rate and regular rhythm. Heart sounds: Normal heart sounds. Pulmonary:      Effort: Pulmonary effort is normal.      Breath sounds: Normal breath sounds. Musculoskeletal:      Cervical back: Neck supple. Lymphadenopathy:      Cervical: No cervical adenopathy. Skin:     General: Skin is warm and dry. Neurological:      Mental Status: She is alert and oriented to person, place, and time.    Psychiatric:         Judgment: Judgment normal.         Data:     Lab Results   Component Value Date     06/21/2019    K 4.3 06/21/2019     06/21/2019    CO2 23 06/21/2019    BUN 12 06/21/2019    CREATININE 0.77 06/21/2019    GLUCOSE 92 06/21/2019    GLUCOSE 74 12/31/2011    PROT 7.2 06/21/2019    LABALBU 4.6 06/21/2019    BILITOT 0.47 06/21/2019    ALKPHOS 40 06/21/2019    AST 21 06/21/2019    ALT 19 06/21/2019     Lab Results   Component Value Date    WBC 6.1 05/20/2019    RBC 3.96 2019    RBC 4.26 2011    HGB 12.5 2019    HCT 36.5 2019    MCV 92.4 2019    MCH 31.5 2019    MCHC 34.1 2019    RDW 13.5 2019     2019     2011    MPV NOT REPORTED 2019     Lab Results   Component Value Date    TSH 3.14 2019     Lab Results   Component Value Date    CHOL 164 2019    HDL 84 2019         Assessment & Plan:        Diagnosis Orders   1. Migraine with aura and without status migrainosus, not intractable       Changing prophylactic med to zonegran. Imitrex nasal spray prescribed as well as ubrelvy samples. F/u if not improving, would try other triptan. Requested Prescriptions     Signed Prescriptions Disp Refills    zonisamide (ZONEGRAN) 100 MG capsule 30 capsule 3     Sig: Take 1 capsule by mouth nightly    SUMAtriptan (IMITREX) 20 MG/ACT nasal spray 1 each 3     Si spray by Nasal route as needed for Migraine    Ubrogepant (UBRELVY) 100 MG TABS 2 tablet 0     Sig: Take 100 mg by mouth daily Samples, [id number]  LOT [id number]  EX 2022         Patient Instructions   SURVEY:    You may be receiving a survey from [email or web address] regarding your visit today. You may get this in the mail, through your MyChart or in your email. Please complete the survey to enable us to provide the highest quality of care to you and your family. If you cannot score us as very good ( 5 Stars) on any question, please feel free to call the office to discuss how we could have made your experience exceptional.     Thank you.     Clinical Care Team:  DO Kirsten Averyon, [facility] Team:  Aruna Nelson          signed by Kristen Talavera DO on 2022 at 11:15 PM  [address]  Dept: [phone]

## 2022-06-12 PROBLEM — G43.109 MIGRAINE WITH AURA AND WITHOUT STATUS MIGRAINOSUS, NOT INTRACTABLE: Status: ACTIVE | Noted: 2022-06-12

## 2022-06-12 RX ORDER — UBROGEPANT 100 MG/1
100 TABLET ORAL DAILY
Qty: 2 TABLET | Refills: 0
Start: 2022-06-12 | End: 2022-10-21 | Stop reason: ALTCHOICE

## 2022-06-22 ENCOUNTER — OFFICE VISIT (OUTPATIENT)
Dept: FAMILY MEDICINE CLINIC | Age: 37
End: 2022-06-22
Payer: COMMERCIAL

## 2022-06-22 VITALS
OXYGEN SATURATION: 99 % | WEIGHT: 145 LBS | BODY MASS INDEX: 24.75 KG/M2 | HEIGHT: 64 IN | DIASTOLIC BLOOD PRESSURE: 78 MMHG | SYSTOLIC BLOOD PRESSURE: 112 MMHG | HEART RATE: 62 BPM

## 2022-06-22 DIAGNOSIS — M54.17 LUMBOSACRAL RADICULOPATHY AT S1: Primary | ICD-10-CM

## 2022-06-22 PROCEDURE — 99213 OFFICE O/P EST LOW 20 MIN: CPT | Performed by: FAMILY MEDICINE

## 2022-06-22 NOTE — PROGRESS NOTES
Name: Erica Virk  : 1985         Chief Complaint:     Chief Complaint   Patient presents with    Back Pain     hurt her back a year ago she thinks when she carried a recliner. History of Present Illness:      Erica Virk is a 40 y.o.  female who presents with Back Pain (hurt her back a year ago she thinks when she carried a recliner. )      HPI    Patient complains of pain in left low back radiating through the buttock and entirety of posterior left lower extremity to the heel. Burning pain. This has been present for about a year and has gradually worsened. Last spring injured back while moving a recliner at home. Unsure whether it only hurt in her back at that point or if she had radiating pain right away. Pain gradually increased over time so she started chiro tx in November. Has had tx many times since then including 6 lumbar decompression treatments. Pain worst with riding in the car. Ice helps a lot. Tylenol and ibuprofen help also, uses prn, doesn't need every day. Other days feels like she's pushing limit of how much ibuprofen is safe. Went to ER 21 with concern for blood clot. No weakness in LLE per pt's awareness though chiro noticed some. No numbness or tingling. Puts ice over sacrum, then moves it onto buttock and then posterior thigh. Medical History:     Patient Active Problem List   Diagnosis    Migraine with aura and without status migrainosus, not intractable       Medications:       Prior to Admission medications    Medication Sig Start Date End Date Taking?  Authorizing Provider   Ubrogepant (UBRELVY) 100 MG TABS Take 100 mg by mouth daily [facility], [id number]  LOT [id number]  EX 22   Timothy Nichols DO   zonisamide (ZONEGRAN) 100 MG capsule Take 1 capsule by mouth nightly 22   Timothy Nichols DO   SUMAtriptan (IMITREX) 20 MG/ACT nasal spray 1 spray by Nasal route as needed for Migraine 22  Timothy Nichols DO LORazepam (ATIVAN) 1 MG tablet Take 1 mg by mouth. 4/26/21   Historical Provider, MD   SUMAtriptan (IMITREX) 50 MG tablet TAKE 1 TABLET BY MOUTH once AS NEEDED FOR MIGRAINE, may repeat in ONE HOUR if needed. Max dose 200 mg in 24 HOURS period 1/18/22   Judge Marietta DO   albuterol sulfate HFA (VENTOLIN HFA) 108 (90 Base) MCG/ACT inhaler Inhale 2 puffs into the lungs 4 times daily as needed for Wheezing 11/23/21   Constantine Raines DO   CALCIUM-MAGNESIUM-ZINC PO Take by mouth    Historical Provider, MD   promethazine (PHENERGAN) 12.5 MG tablet Take 1 tablet by mouth every 6 hours as needed for Nausea 6/5/18   Judge Marietta DO        Allergies:       Patient has no known allergies. Physical Exam:     Vitals:  /78   Pulse 62   Ht 5' 4\" (1.626 m)   Wt 145 lb (65.8 kg)   SpO2 99%   BMI 24.89 kg/m²   Physical Exam  Musculoskeletal:      Comments: Bilateral lower extremity strength 5/5. Spinal curves within normal limits. Tenderness over left piriformis. Positive straight leg raise on the left. L hip ROM WNL   Neurological:      Deep Tendon Reflexes: Reflexes normal (keyona patellar 2+). Psychiatric:         Mood and Affect: Mood normal.         Behavior: Behavior normal.         Assessment & Plan:        Diagnosis Orders   1. Lumbosacral radiculopathy at S1  MRI LUMBAR SPINE WO CONTRAST    External Referral To Physical Therapy     S1 radicular pain, present for nearly a year, has been undergoing chiropractic treatment for 6 months, had 6 decompression treatment sessions. Has failed NSAID treatment. MRI ordered.   Start physical therapy.          signed by Judge Marietta DO on 6/24/2022 at 3:55 PM  [address]  Dept: [phone]

## 2022-06-30 RX ORDER — ZONISAMIDE 100 MG/1
100 CAPSULE ORAL NIGHTLY
Qty: 90 CAPSULE | Refills: 1 | Status: SHIPPED | OUTPATIENT
Start: 2022-06-30

## 2022-06-30 NOTE — TELEPHONE ENCOUNTER
Last visit:  6/22/2022  Next Visit Date:    Future Appointments   Date Time Provider Patrha Quintana   7/1/2022  3:00 PM [facility] ([place], [place]) Rad         Medication List:  Prior to Admission medications    Medication Sig Start Date End Date Taking? Authorizing Provider   Ubrogepant (UBRELVY) 100 MG TABS Take 100 mg by mouth daily Samples, [id number]  LOT [id number]  EX 09/2022 6/12/22   Darletta Meigs, DO   zonisamide (ZONEGRAN) 100 MG capsule Take 1 capsule by mouth nightly 6/9/22   Darletta Meigs, DO   SUMAtriptan (IMITREX) 20 MG/ACT nasal spray 1 spray by Nasal route as needed for Migraine 6/9/22 6/9/23  Darletta Meigs, DO   LORazepam (ATIVAN) 1 MG tablet Take 1 mg by mouth. 4/26/21   Historical Provider, MD   SUMAtriptan (IMITREX) 50 MG tablet TAKE 1 TABLET BY MOUTH once AS NEEDED FOR MIGRAINE, may repeat in ONE HOUR if needed.  Max dose 200 mg in 24 HOURS period 1/18/22   Darletta Meigs, DO   albuterol sulfate HFA (VENTOLIN HFA) 108 (90 Base) MCG/ACT inhaler Inhale 2 puffs into the lungs 4 times daily as needed for Wheezing 11/23/21   Irwin Raines, DO   CALCIUM-MAGNESIUM-ZINC PO Take by mouth    Historical Provider, MD   promethazine (PHENERGAN) 12.5 MG tablet Take 1 tablet by mouth every 6 hours as needed for Nausea 6/5/18   Darletta Meigs, DO

## 2022-07-05 ENCOUNTER — HOSPITAL ENCOUNTER (OUTPATIENT)
Dept: MRI IMAGING | Age: 37
Discharge: HOME | End: 2022-07-07
Payer: COMMERCIAL

## 2022-07-05 DIAGNOSIS — M54.17 LUMBOSACRAL RADICULOPATHY AT S1: ICD-10-CM

## 2022-07-05 DIAGNOSIS — F41.9 ANXIETY: ICD-10-CM

## 2022-07-05 PROCEDURE — 72148 MRI LUMBAR SPINE W/O DYE: CPT

## 2022-07-05 RX ORDER — LORAZEPAM 1 MG/1
1 TABLET ORAL EVERY 8 HOURS PRN
Qty: 20 TABLET | Refills: 0 | Status: SHIPPED | OUTPATIENT
Start: 2022-07-05 | End: 2022-08-04

## 2022-07-05 RX ORDER — LORAZEPAM 1 MG/1
1 TABLET ORAL
Status: CANCELLED | OUTPATIENT
Start: 2022-07-05

## 2022-07-05 NOTE — TELEPHONE ENCOUNTER
Patient asking for a new script for her Ativan - patient uses [facility] in [facility] Maintenance   Topic Date Due    Varicella vaccine (1 of 2 - 2-dose childhood series) Never done    HIV screen  Never done    DTaP/Tdap/Td vaccine (1 - Tdap) Never done    Cervical cancer screen  Never done    COVID-19 Vaccine (3 - Booster for [employer] series) 02/28/2022    Flu vaccine (1) 09/01/2022    Depression Screen  04/20/2023    Hepatitis C screen  Completed    Hepatitis A vaccine  Aged Out    Hepatitis B vaccine  Aged Out    Hib vaccine  Aged Out    Meningococcal (ACWY) vaccine  Aged Out    Pneumococcal 0-64 years Vaccine  Aged Out             (applicable per patient's age: Cancer Screenings, Depression Screening, Fall Risk Screening, Immunizations)    LDL Cholesterol (mg/dL)   Date Value   06/21/2019 73     AST (U/L)   Date Value   06/21/2019 21     ALT (U/L)   Date Value   06/21/2019 19     BUN (mg/dL)   Date Value   06/21/2019 12      (goal A1C is < 7)   (goal LDL is <100) need 30-50% reduction from baseline     BP Readings from Last 3 Encounters:   06/22/22 112/78   06/09/22 100/62   04/20/22 110/70    (goal /80)      All Future Testing planned in [facility]:  Lab Frequency Next Occurrence       Next Visit Date:  No future appointments.          Patient Active Problem List:     Migraine with aura and without status migrainosus, not intractable

## 2022-07-05 NOTE — TELEPHONE ENCOUNTER
Last OV: 6/22/2022 S1  Last RX:    Next scheduled apt: Visit date not found       pt requesting a refill

## 2022-07-05 NOTE — TELEPHONE ENCOUNTER
rx sent    Controlled Substance Monitoring:    Acute and Chronic Pain Monitoring:   RX Monitoring 7/5/2022   Periodic Controlled Substance Monitoring No signs of potential drug abuse or diversion identified.

## 2022-08-16 ENCOUNTER — NURSE ONLY (OUTPATIENT)
Dept: FAMILY MEDICINE CLINIC | Age: 37
End: 2022-08-16
Payer: COMMERCIAL

## 2022-08-16 DIAGNOSIS — G43.109 MIGRAINE WITH AURA AND WITHOUT STATUS MIGRAINOSUS, NOT INTRACTABLE: Primary | ICD-10-CM

## 2022-08-16 PROCEDURE — 96372 THER/PROPH/DIAG INJ SC/IM: CPT | Performed by: FAMILY MEDICINE

## 2022-08-16 RX ORDER — KETOROLAC TROMETHAMINE 30 MG/ML
30 INJECTION, SOLUTION INTRAMUSCULAR; INTRAVENOUS ONCE
Status: COMPLETED | OUTPATIENT
Start: 2022-08-16 | End: 2022-08-16

## 2022-08-16 RX ADMIN — KETOROLAC TROMETHAMINE 30 MG: 30 INJECTION, SOLUTION INTRAMUSCULAR; INTRAVENOUS at 14:35

## 2022-08-16 NOTE — PROGRESS NOTES
After obtaining consent, and per orders of Dr. Randol Boeck , injection of Toradol  given in Left deltoid by Cindy Ramírez MA. Patient instructed to remain in clinic for 20 minutes afterwards, and to report any adverse reaction to me immediately.

## 2022-10-18 ENCOUNTER — TELEPHONE (OUTPATIENT)
Dept: FAMILY MEDICINE CLINIC | Age: 37
End: 2022-10-18

## 2022-10-18 NOTE — TELEPHONE ENCOUNTER
Pt called stating that her MRI of spine was not clearly read for her Chiropractor and her Chiropractor is asking if it can be reread. Chiropractor stated her read on the MRI is \"too generic\" . How many mm herniated and in what direction? ? Should the patient just call radiology? Chiropractor told her to call her PCP.       Thank you    -To call back [phone]

## 2022-10-18 NOTE — TELEPHONE ENCOUNTER
Radiology is calling [place] to obtain a more detail reading to the the MRI to report results back to Dr. Jhonny Chavarria once completed.

## 2022-10-21 ENCOUNTER — TELEPHONE (OUTPATIENT)
Dept: FAMILY MEDICINE CLINIC | Age: 37
End: 2022-10-21

## 2022-10-21 RX ORDER — AMITRIPTYLINE HYDROCHLORIDE 10 MG/1
TABLET, FILM COATED ORAL
COMMUNITY
Start: 2022-09-14

## 2022-10-21 RX ORDER — ZONISAMIDE 50 MG/1
CAPSULE ORAL
COMMUNITY
Start: 2022-09-14

## 2022-10-21 RX ORDER — NORETHINDRONE 0.35 MG
KIT ORAL
COMMUNITY
Start: 2022-10-14

## 2022-10-21 RX ORDER — NARATRIPTAN 2.5 MG/1
TABLET ORAL
COMMUNITY
Start: 2022-09-28

## 2022-10-21 NOTE — TELEPHONE ENCOUNTER
Okay, please have her bring the meds with her so we know exactly what she is taking or else find out from the pharmacy what she has been getting. The estrogen/progesterone thing doesn't make sense. I also recommend that she do have some type of abortive treatment on hand, preferably given by Dr. Harman Ramos since she is managing the problem.

## 2022-10-21 NOTE — TELEPHONE ENCOUNTER
Patient has a migraine - can't get ahold of Dr Christian Og - I did tell her to keep trying - does have medication changes since she last saw Dr Latia Swanson - taking Amerge, Promethazine, and an Estrogen medication    Health Maintenance   Topic Date Due    Varicella vaccine (1 of 2 - 2-dose childhood series) Never done    HIV screen  Never done    DTaP/Tdap/Td vaccine (1 - Tdap) Never done    Cervical cancer screen  Never done    COVID-19 Vaccine (3 - Booster for Pfizer series) 11/24/2021    Flu vaccine (1) 08/01/2022    Depression Screen  04/20/2023    Hepatitis C screen  Completed    Hepatitis A vaccine  Aged Out    Hib vaccine  Aged Out    Meningococcal (ACWY) vaccine  Aged Out    Pneumococcal 0-64 years Vaccine  Aged Out             (applicable per patient's age: Cancer Screenings, Depression Screening, Fall Risk Screening, Immunizations)    LDL Cholesterol (mg/dL)   Date Value   06/21/2019 73     AST (U/L)   Date Value   06/21/2019 21     ALT (U/L)   Date Value   06/21/2019 19     BUN (mg/dL)   Date Value   06/21/2019 12      (goal A1C is < 7)   (goal LDL is <100) need 30-50% reduction from baseline     BP Readings from Last 3 Encounters:   06/22/22 112/78   06/09/22 100/62   04/20/22 110/70    (goal /80)      All Future Testing planned in [facility]:  Lab Frequency Next Occurrence       Next Visit Date:  No future appointments.          Patient Active Problem List:     Migraine with aura and without status migrainosus, not intractable

## 2022-10-21 NOTE — TELEPHONE ENCOUNTER
What's the story with the estrogen? What  has she tried for this particular migraine?   If needed she can come in for toradol injection with/without phenergan

## 2022-10-21 NOTE — TELEPHONE ENCOUNTER
Patient states that she was having some heavier periods with a little clotting, obgyn suggested estrogen stating that it was safer than progesterone. States that she has taken phenergan and zonisamide and that's all she takes.  She is coming in for toradol

## 2022-10-28 ENCOUNTER — TELEPHONE (OUTPATIENT)
Dept: FAMILY MEDICINE CLINIC | Age: 37
End: 2022-10-28

## 2023-02-07 ENCOUNTER — OFFICE VISIT (OUTPATIENT)
Dept: FAMILY MEDICINE CLINIC | Age: 38
End: 2023-02-07
Payer: COMMERCIAL

## 2023-02-07 VITALS
HEIGHT: 64 IN | SYSTOLIC BLOOD PRESSURE: 112 MMHG | HEART RATE: 70 BPM | OXYGEN SATURATION: 99 % | BODY MASS INDEX: 23.9 KG/M2 | WEIGHT: 140 LBS | DIASTOLIC BLOOD PRESSURE: 70 MMHG

## 2023-02-07 DIAGNOSIS — Z00.00 ROUTINE HEALTH MAINTENANCE: Primary | ICD-10-CM

## 2023-02-07 PROCEDURE — 99395 PREV VISIT EST AGE 18-39: CPT | Performed by: FAMILY MEDICINE

## 2023-02-07 RX ORDER — ATOGEPANT 60 MG/1
TABLET ORAL
COMMUNITY
Start: 2022-12-20

## 2023-02-07 RX ORDER — GABAPENTIN 300 MG/1
CAPSULE ORAL
COMMUNITY
Start: 2023-01-31 | End: 2023-03-03

## 2023-02-07 RX ORDER — TIZANIDINE 4 MG/1
TABLET ORAL
COMMUNITY
Start: 2022-11-04

## 2023-02-07 RX ORDER — RIZATRIPTAN BENZOATE 10 MG/1
TABLET ORAL
COMMUNITY
Start: 2022-10-24

## 2023-02-07 SDOH — ECONOMIC STABILITY: FOOD INSECURITY: WITHIN THE PAST 12 MONTHS, YOU WORRIED THAT YOUR FOOD WOULD RUN OUT BEFORE YOU GOT MONEY TO BUY MORE.: NEVER TRUE

## 2023-02-07 SDOH — ECONOMIC STABILITY: INCOME INSECURITY: HOW HARD IS IT FOR YOU TO PAY FOR THE VERY BASICS LIKE FOOD, HOUSING, MEDICAL CARE, AND HEATING?: NOT HARD AT ALL

## 2023-02-07 SDOH — ECONOMIC STABILITY: HOUSING INSECURITY
IN THE LAST 12 MONTHS, WAS THERE A TIME WHEN YOU DID NOT HAVE A STEADY PLACE TO SLEEP OR SLEPT IN A SHELTER (INCLUDING NOW)?: NO

## 2023-02-07 SDOH — ECONOMIC STABILITY: FOOD INSECURITY: WITHIN THE PAST 12 MONTHS, THE FOOD YOU BOUGHT JUST DIDN'T LAST AND YOU DIDN'T HAVE MONEY TO GET MORE.: NEVER TRUE

## 2023-02-07 ASSESSMENT — PATIENT HEALTH QUESTIONNAIRE - PHQ9
SUM OF ALL RESPONSES TO PHQ QUESTIONS 1-9: 0
1. LITTLE INTEREST OR PLEASURE IN DOING THINGS: 0
SUM OF ALL RESPONSES TO PHQ QUESTIONS 1-9: 0
SUM OF ALL RESPONSES TO PHQ QUESTIONS 1-9: 0
2. FEELING DOWN, DEPRESSED OR HOPELESS: 0
SUM OF ALL RESPONSES TO PHQ QUESTIONS 1-9: 0
SUM OF ALL RESPONSES TO PHQ9 QUESTIONS 1 & 2: 0

## 2023-02-07 ASSESSMENT — ENCOUNTER SYMPTOMS
GASTROINTESTINAL NEGATIVE: 1
RESPIRATORY NEGATIVE: 1
EYES NEGATIVE: 1

## 2023-02-07 NOTE — PROGRESS NOTES
Name: Janice Del Cid  : 1985         Chief Complaint:     Chief Complaint   Patient presents with    Employment Physical       History of Present Illness:      Janice Del Cid is a 40 y.o.  female who presents with Employment Physical      HPI     Health maintenance, doing well. 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 Chronic LLE pain, taking 1/2 zanaflex and gabapentin BID, sometimes extra zanaflex 1/2 tab in the afternoon, helps a lot with migraines as well as LLE pain. Had epidural injection and will have piriformis injection. Past Medical History:     Past Medical History:   Diagnosis Date    Heart murmur     at birth    Migraines         Past Surgical History:     Past Surgical History:   Procedure Laterality Date    APPENDECTOMY      OVARY REMOVAL Left     TYMPANOSTOMY TUBE PLACEMENT          Medications:       Prior to Admission medications    Medication Sig Start Date End Date Taking? Authorizing Provider   tiZANidine (ZANAFLEX) 4 MG tablet 1/2 am and 1/2 or 1 at bed- sedating 22  Yes Historical Provider, MD   rizatriptan (MAXALT) 10 MG tablet 1 po prn migraine May repeat in 2 hours if needed 10/24/22  Yes Historical Provider, MD   gabapentin (NEURONTIN) 300 MG capsule 1 po TID. Dont stop abruptly.  1/31/23 3/3/23 Yes Historical Provider, MD   Atogepant (QULIPTA) 60 MG TABS 1 po q day 22  Yes Historical Provider, MD   DEBLITANE 0.35 MG tablet TAKE 1 TABLET BY MOUTH EVERY DAY 10/14/22   Historical Provider, MD   albuterol sulfate HFA (VENTOLIN HFA) 108 (90 Base) MCG/ACT inhaler Inhale 2 puffs into the lungs 4 times daily as needed for Wheezing 21   Andrade Raines DO   CALCIUM-MAGNESIUM-ZINC PO Take by mouth    Historical Provider, MD   promethazine (PHENERGAN) 12.5 MG tablet Take 1 tablet by mouth every 6 hours as needed for Nausea 18   Alona Rios DO        Allergies: Patient has no known allergies. Social History:     Tobacco:    reports that she has never smoked. She has never used smokeless tobacco.  Alcohol:      reports current alcohol use. Drug Use:  reports no history of drug use. Family History:     Family History   Problem Relation Age of Onset    Hypertension Mother        Review of Systems:     Positive and Negative as described in HPI    Review of Systems   Constitutional: Negative. HENT: Negative. Eyes: Negative. Respiratory: Negative. Cardiovascular: Negative. Gastrointestinal: Negative. Genitourinary: Negative. Musculoskeletal: Negative. Skin: Negative. Neurological: Negative. Hematological: Negative. Psychiatric/Behavioral: Negative. Physical Exam:     Vitals:  /70   Pulse 70   Ht 5' 4\" (1.626 m)   Wt 140 lb (63.5 kg)   SpO2 99%   BMI 24.03 kg/m²   Physical Exam  Vitals and nursing note reviewed. Constitutional:       Appearance: Normal appearance. She is well-developed. She is not ill-appearing. HENT:      Right Ear: Hearing and tympanic membrane normal.      Left Ear: Hearing and tympanic membrane normal.      Nose: Nose normal. No mucosal edema. Mouth/Throat:      Mouth: Mucous membranes are moist.      Pharynx: Oropharynx is clear. Eyes:      Pupils: Pupils are equal, round, and reactive to light. Neck:      Thyroid: No thyroid mass or thyromegaly. Cardiovascular:      Rate and Rhythm: Normal rate and regular rhythm. Heart sounds: S1 normal and S2 normal. No murmur heard. Pulmonary:      Effort: Pulmonary effort is normal.      Breath sounds: Normal breath sounds. Abdominal:      General: Bowel sounds are normal.      Palpations: Abdomen is soft. Tenderness: There is no abdominal tenderness. Lymphadenopathy:      Cervical: No cervical adenopathy. Skin:     General: Skin is warm and dry. Findings: No rash.    Neurological:      Mental Status: She is alert and oriented to person, place, and time. Psychiatric:         Mood and Affect: Mood normal.         Behavior: Behavior normal.       Data:     Lab Results   Component Value Date/Time     06/21/2019 11:31 AM    K 4.3 06/21/2019 11:31 AM     06/21/2019 11:31 AM    CO2 23 06/21/2019 11:31 AM    BUN 12 06/21/2019 11:31 AM    CREATININE 0.77 06/21/2019 11:31 AM    GLUCOSE 92 06/21/2019 11:31 AM    GLUCOSE 74 12/31/2011 05:25 PM    PROT 7.2 06/21/2019 11:31 AM    LABALBU 4.6 06/21/2019 11:31 AM    BILITOT 0.47 06/21/2019 11:31 AM    ALKPHOS 40 06/21/2019 11:31 AM    AST 21 06/21/2019 11:31 AM    ALT 19 06/21/2019 11:31 AM     Lab Results   Component Value Date/Time    WBC 6.1 05/20/2019 06:30 PM    RBC 3.96 05/20/2019 06:30 PM    RBC 4.26 12/31/2011 05:25 PM    HGB 12.5 05/20/2019 06:30 PM    HCT 36.5 05/20/2019 06:30 PM    MCV 92.4 05/20/2019 06:30 PM    MCH 31.5 05/20/2019 06:30 PM    MCHC 34.1 05/20/2019 06:30 PM    RDW 13.5 05/20/2019 06:30 PM     05/20/2019 06:30 PM     12/31/2011 05:25 PM    MPV NOT REPORTED 05/20/2019 06:30 PM     Lab Results   Component Value Date/Time    TSH 3.14 05/20/2019 06:30 PM     Lab Results   Component Value Date/Time    CHOL 164 06/21/2019 11:31 AM    HDL 84 06/21/2019 11:31 AM         Assessment & Plan:        Diagnosis Orders   1. Routine health maintenance          History & exam WNL, cleared for work. She will update tb screening and immunization records at [facility], [facility]. Cont following with PM&R for LLE pain.        Electronically signed by Lb Rausch DO on 2/7/2023 at 11:08 PM   [address]  [address]  Dept: [phone]

## 2023-02-07 NOTE — LETTER
[facility]  [address] [address]  Phone: [phone]  Fax: [address], [place]        February 7, 2023     Patient: Fernando Tyson   YOB: 1985   Date of Visit: 2/7/2023       To Whom It May Concern: It is my medical opinion that Fernando Tyson is physically fit and free of communicable diseases. 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 If you have any questions or concerns, please don't hesitate to call.     Sincerely,        Slime Jonas, DO

## 2023-02-13 ENCOUNTER — TELEPHONE (OUTPATIENT)
Dept: FAMILY MEDICINE CLINIC | Age: 38
End: 2023-02-13

## 2023-02-13 RX ORDER — AMOXICILLIN 875 MG/1
875 TABLET, COATED ORAL 2 TIMES DAILY
Qty: 14 TABLET | Refills: 0 | Status: SHIPPED | OUTPATIENT
Start: 2023-02-13 | End: 2023-02-20

## 2023-02-13 NOTE — TELEPHONE ENCOUNTER
Pt reached out to me c/o ear pain. Last visit had had mild injection of (I believe) L ear. Atb sent.

## 2023-11-06 ENCOUNTER — TELEPHONE (OUTPATIENT)
Dept: CARDIOLOGY CLINIC | Age: 38
End: 2023-11-06

## 2023-11-06 DIAGNOSIS — R00.1 BRADYCARDIA: Primary | ICD-10-CM

## 2023-11-06 NOTE — TELEPHONE ENCOUNTER
Last seen 2019-HR has been tredning down since then-  Currently HR goes into the 40's per pt will get sob   But no dizziness or lightheadedness etc  Not on any heart meds only HA meds  Not a runner per pt   108/60-40  129/79-42  120/77-44  130/80  But does back up       Pt notified PER > JOSÉ MANUEL  Will get 48 HR holter first.

## 2023-11-07 ENCOUNTER — HOSPITAL ENCOUNTER (OUTPATIENT)
Age: 38
Discharge: HOME | End: 2023-11-09
Attending: INTERNAL MEDICINE
Payer: COMMERCIAL

## 2023-11-07 DIAGNOSIS — R00.1 BRADYCARDIA: ICD-10-CM

## 2023-11-07 PROCEDURE — 93225 XTRNL ECG REC<48 HRS REC: CPT

## 2023-11-15 LAB
ACQUISITION DURATION: NORMAL S
AVERAGE HEART RATE: 59 BPM
EKG DIAGNOSIS: NORMAL
HOLTER MAX HEART RATE: 97 BPM
HOOKUP DATE: NORMAL
HOOKUP TIME: NORMAL
MAX HEART RATE TIME/DATE: NORMAL
MIN HEART RATE TIME/DATE: NORMAL
MIN HEART RATE: 40 BPM
NUMBER OF QRS COMPLEXES: NORMAL
NUMBER OF SUPRAVENTRICULAR COUPLETS: 0
NUMBER OF SUPRAVENTRICULAR ECTOPICS: 0
NUMBER OF SUPRAVENTRICULAR ISOLATED BEATS: 0
NUMBER OF VENTRICULAR BIGEMINAL CYCLES: 0
NUMBER OF VENTRICULAR COUPLETS: 0
NUMBER OF VENTRICULAR ECTOPICS: 5

## 2023-11-16 ENCOUNTER — TELEPHONE (OUTPATIENT)
Dept: CARDIOLOGY CLINIC | Age: 38
End: 2023-11-16